# Patient Record
Sex: FEMALE | Race: WHITE | Employment: PART TIME | URBAN - METROPOLITAN AREA
[De-identification: names, ages, dates, MRNs, and addresses within clinical notes are randomized per-mention and may not be internally consistent; named-entity substitution may affect disease eponyms.]

---

## 2017-01-14 ENCOUNTER — HOSPITAL ENCOUNTER (OUTPATIENT)
Dept: MAMMOGRAPHY | Age: 62
Discharge: HOME | End: 2017-01-14
Attending: OBSTETRICS & GYNECOLOGY | Admitting: OBSTETRICS & GYNECOLOGY

## 2017-01-14 DIAGNOSIS — Z12.31 ENCOUNTER FOR SCREENING MAMMOGRAM FOR MALIGNANT NEOPLASM OF BREAST: ICD-10-CM

## 2018-05-26 ENCOUNTER — HOSPITAL ENCOUNTER (OUTPATIENT)
Dept: OTHER | Age: 63
Discharge: HOME | End: 2018-05-26
Attending: INTERNAL MEDICINE | Admitting: INTERNAL MEDICINE

## 2018-05-26 LAB
A/G RATIO: 2 (ref 1.1–2.2)
ALBUMIN SERPL-MCNC: 4.4 G/DL (ref 3.4–5)
ALP BLD-CCNC: 63 U/L (ref 40–129)
ALT SERPL-CCNC: 25 U/L (ref 10–40)
ANION GAP SERPL CALCULATED.3IONS-SCNC: 11 MMOL/L (ref 3–16)
AST SERPL-CCNC: 21 U/L (ref 15–37)
BASOPHILS ABSOLUTE: 0 K/UL (ref 0–0.2)
BASOPHILS RELATIVE PERCENT: 0.8 %
BILIRUB SERPL-MCNC: 0.4 MG/DL (ref 0–1)
BUN BLDV-MCNC: 22 MG/DL (ref 7–20)
CALCIUM SERPL-MCNC: 9.3 MG/DL (ref 8.3–10.6)
CHLORIDE BLD-SCNC: 105 MMOL/L (ref 99–110)
CHOLESTEROL, FASTING: 260 MG/DL (ref 0–199)
CO2: 25 MMOL/L (ref 21–32)
CREAT SERPL-MCNC: 0.8 MG/DL (ref 0.6–1.2)
EOSINOPHILS ABSOLUTE: 0.1 K/UL (ref 0–0.6)
EOSINOPHILS RELATIVE PERCENT: 2.3 %
GFR AFRICAN AMERICAN: >60
GFR NON-AFRICAN AMERICAN: >60
GLOBULIN: 2.2 G/DL
GLUCOSE BLD-MCNC: 106 MG/DL (ref 70–99)
HCT VFR BLD CALC: 42.4 % (ref 36–48)
HDLC SERPL-MCNC: 59 MG/DL (ref 40–60)
HEMOGLOBIN: 14.6 G/DL (ref 12–16)
LDL CHOLESTEROL CALCULATED: 158 MG/DL
LYMPHOCYTES ABSOLUTE: 1.8 K/UL (ref 1–5.1)
LYMPHOCYTES RELATIVE PERCENT: 33.6 %
MCH RBC QN AUTO: 29 PG (ref 26–34)
MCHC RBC AUTO-ENTMCNC: 34.4 G/DL (ref 31–36)
MCV RBC AUTO: 84.2 FL (ref 80–100)
MONOCYTES ABSOLUTE: 0.5 K/UL (ref 0–1.3)
MONOCYTES RELATIVE PERCENT: 8.7 %
NEUTROPHILS ABSOLUTE: 2.9 K/UL (ref 1.7–7.7)
NEUTROPHILS RELATIVE PERCENT: 54.6 %
PDW BLD-RTO: 13.5 % (ref 12.4–15.4)
PLATELET # BLD: 222 K/UL (ref 135–450)
PMV BLD AUTO: 7.6 FL (ref 5–10.5)
POTASSIUM SERPL-SCNC: 4.3 MMOL/L (ref 3.5–5.1)
RBC # BLD: 5.03 M/UL (ref 4–5.2)
SODIUM BLD-SCNC: 141 MMOL/L (ref 136–145)
TOTAL PROTEIN: 6.6 G/DL (ref 6.4–8.2)
TRIGLYCERIDE, FASTING: 216 MG/DL (ref 0–150)
TSH SERPL DL<=0.05 MIU/L-ACNC: 2.26 UIU/ML (ref 0.27–4.2)
VITAMIN B-12: 548 PG/ML (ref 211–911)
VLDLC SERPL CALC-MCNC: 43 MG/DL
WBC # BLD: 5.4 K/UL (ref 4–11)

## 2019-10-18 ENCOUNTER — OFFICE VISIT (OUTPATIENT)
Dept: ORTHOPEDIC SURGERY | Age: 64
End: 2019-10-18
Payer: MEDICARE

## 2019-10-18 VITALS — WEIGHT: 130.07 LBS | HEIGHT: 59 IN | BODY MASS INDEX: 26.22 KG/M2

## 2019-10-18 DIAGNOSIS — M76.61 RIGHT ACHILLES TENDINITIS: ICD-10-CM

## 2019-10-18 DIAGNOSIS — M25.572 ACUTE LEFT ANKLE PAIN: Primary | ICD-10-CM

## 2019-10-18 PROCEDURE — 1036F TOBACCO NON-USER: CPT | Performed by: ORTHOPAEDIC SURGERY

## 2019-10-18 PROCEDURE — 3017F COLORECTAL CA SCREEN DOC REV: CPT | Performed by: ORTHOPAEDIC SURGERY

## 2019-10-18 PROCEDURE — 1123F ACP DISCUSS/DSCN MKR DOCD: CPT | Performed by: ORTHOPAEDIC SURGERY

## 2019-10-18 PROCEDURE — 4040F PNEUMOC VAC/ADMIN/RCVD: CPT | Performed by: ORTHOPAEDIC SURGERY

## 2019-10-18 PROCEDURE — G8400 PT W/DXA NO RESULTS DOC: HCPCS | Performed by: ORTHOPAEDIC SURGERY

## 2019-10-18 PROCEDURE — G8428 CUR MEDS NOT DOCUMENT: HCPCS | Performed by: ORTHOPAEDIC SURGERY

## 2019-10-18 PROCEDURE — 99203 OFFICE O/P NEW LOW 30 MIN: CPT | Performed by: ORTHOPAEDIC SURGERY

## 2019-10-18 PROCEDURE — G8417 CALC BMI ABV UP PARAM F/U: HCPCS | Performed by: ORTHOPAEDIC SURGERY

## 2019-10-18 PROCEDURE — 1090F PRES/ABSN URINE INCON ASSESS: CPT | Performed by: ORTHOPAEDIC SURGERY

## 2019-10-18 PROCEDURE — G8484 FLU IMMUNIZE NO ADMIN: HCPCS | Performed by: ORTHOPAEDIC SURGERY

## 2020-07-29 ENCOUNTER — APPOINTMENT (OUTPATIENT)
Dept: GENERAL RADIOLOGY | Age: 65
End: 2020-07-29
Payer: MEDICARE

## 2020-07-29 ENCOUNTER — HOSPITAL ENCOUNTER (OUTPATIENT)
Age: 65
Setting detail: OBSERVATION
Discharge: HOME | End: 2020-07-30
Attending: EMERGENCY MEDICINE | Admitting: INTERNAL MEDICINE
Payer: MEDICARE

## 2020-07-29 PROBLEM — R07.9 CHEST PAIN: Status: ACTIVE | Noted: 2020-07-29

## 2020-07-29 PROBLEM — K21.9 GERD (GASTROESOPHAGEAL REFLUX DISEASE): Status: ACTIVE | Noted: 2020-07-29

## 2020-07-29 PROBLEM — R55 NEAR SYNCOPE: Status: ACTIVE | Noted: 2020-07-29

## 2020-07-29 LAB
A/G RATIO: 2 (ref 1.1–2.2)
ALBUMIN SERPL-MCNC: 4.5 G/DL (ref 3.4–5)
ALP BLD-CCNC: 66 U/L (ref 40–129)
ALT SERPL-CCNC: 28 U/L (ref 10–40)
ANION GAP SERPL CALCULATED.3IONS-SCNC: 12 MMOL/L (ref 3–16)
AST SERPL-CCNC: 21 U/L (ref 15–37)
BASOPHILS ABSOLUTE: 0 K/UL (ref 0–0.2)
BASOPHILS RELATIVE PERCENT: 0.9 %
BILIRUB SERPL-MCNC: 0.4 MG/DL (ref 0–1)
BUN BLDV-MCNC: 23 MG/DL (ref 7–20)
CALCIUM SERPL-MCNC: 9.7 MG/DL (ref 8.3–10.6)
CHLORIDE BLD-SCNC: 101 MMOL/L (ref 99–110)
CO2: 25 MMOL/L (ref 21–32)
CREAT SERPL-MCNC: 0.9 MG/DL (ref 0.6–1.2)
D DIMER: <200 NG/ML DDU (ref 0–229)
EKG ATRIAL RATE: 78 BPM
EKG DIAGNOSIS: NORMAL
EKG P AXIS: 58 DEGREES
EKG P-R INTERVAL: 156 MS
EKG Q-T INTERVAL: 384 MS
EKG QRS DURATION: 76 MS
EKG QTC CALCULATION (BAZETT): 437 MS
EKG R AXIS: 7 DEGREES
EKG T AXIS: 35 DEGREES
EKG VENTRICULAR RATE: 78 BPM
EOSINOPHILS ABSOLUTE: 0.1 K/UL (ref 0–0.6)
EOSINOPHILS RELATIVE PERCENT: 1.8 %
GFR AFRICAN AMERICAN: >60
GFR NON-AFRICAN AMERICAN: >60
GLOBULIN: 2.3 G/DL
GLUCOSE BLD-MCNC: 110 MG/DL (ref 70–99)
HCT VFR BLD CALC: 43.7 % (ref 36–48)
HEMOGLOBIN: 14.7 G/DL (ref 12–16)
LIPASE: 76 U/L (ref 13–60)
LYMPHOCYTES ABSOLUTE: 2.2 K/UL (ref 1–5.1)
LYMPHOCYTES RELATIVE PERCENT: 40.1 %
MCH RBC QN AUTO: 29 PG (ref 26–34)
MCHC RBC AUTO-ENTMCNC: 33.6 G/DL (ref 31–36)
MCV RBC AUTO: 86.3 FL (ref 80–100)
MONOCYTES ABSOLUTE: 0.4 K/UL (ref 0–1.3)
MONOCYTES RELATIVE PERCENT: 6.8 %
NEUTROPHILS ABSOLUTE: 2.8 K/UL (ref 1.7–7.7)
NEUTROPHILS RELATIVE PERCENT: 50.4 %
PDW BLD-RTO: 13.4 % (ref 12.4–15.4)
PLATELET # BLD: 191 K/UL (ref 135–450)
PMV BLD AUTO: 7.7 FL (ref 5–10.5)
POTASSIUM SERPL-SCNC: 3.8 MMOL/L (ref 3.5–5.1)
RBC # BLD: 5.06 M/UL (ref 4–5.2)
SODIUM BLD-SCNC: 138 MMOL/L (ref 136–145)
TOTAL PROTEIN: 6.8 G/DL (ref 6.4–8.2)
TROPONIN: <0.01 NG/ML
WBC # BLD: 5.5 K/UL (ref 4–11)

## 2020-07-29 PROCEDURE — 6370000000 HC RX 637 (ALT 250 FOR IP): Performed by: INTERNAL MEDICINE

## 2020-07-29 PROCEDURE — 2580000003 HC RX 258: Performed by: INTERNAL MEDICINE

## 2020-07-29 PROCEDURE — G0378 HOSPITAL OBSERVATION PER HR: HCPCS

## 2020-07-29 PROCEDURE — 85025 COMPLETE CBC W/AUTO DIFF WBC: CPT

## 2020-07-29 PROCEDURE — 2580000003 HC RX 258: Performed by: NURSE PRACTITIONER

## 2020-07-29 PROCEDURE — 93010 ELECTROCARDIOGRAM REPORT: CPT | Performed by: INTERNAL MEDICINE

## 2020-07-29 PROCEDURE — 80053 COMPREHEN METABOLIC PANEL: CPT

## 2020-07-29 PROCEDURE — 85379 FIBRIN DEGRADATION QUANT: CPT

## 2020-07-29 PROCEDURE — 93005 ELECTROCARDIOGRAM TRACING: CPT | Performed by: EMERGENCY MEDICINE

## 2020-07-29 PROCEDURE — 99285 EMERGENCY DEPT VISIT HI MDM: CPT

## 2020-07-29 PROCEDURE — 84484 ASSAY OF TROPONIN QUANT: CPT

## 2020-07-29 PROCEDURE — 83690 ASSAY OF LIPASE: CPT

## 2020-07-29 PROCEDURE — 71046 X-RAY EXAM CHEST 2 VIEWS: CPT

## 2020-07-29 PROCEDURE — 36415 COLL VENOUS BLD VENIPUNCTURE: CPT

## 2020-07-29 RX ORDER — PROMETHAZINE HYDROCHLORIDE 25 MG/1
12.5 TABLET ORAL EVERY 6 HOURS PRN
Status: DISCONTINUED | OUTPATIENT
Start: 2020-07-29 | End: 2020-07-30 | Stop reason: HOSPADM

## 2020-07-29 RX ORDER — SODIUM CHLORIDE 9 MG/ML
INJECTION, SOLUTION INTRAVENOUS CONTINUOUS
Status: DISCONTINUED | OUTPATIENT
Start: 2020-07-29 | End: 2020-07-29

## 2020-07-29 RX ORDER — NITROGLYCERIN 0.4 MG/1
0.4 TABLET SUBLINGUAL EVERY 5 MIN PRN
Status: DISCONTINUED | OUTPATIENT
Start: 2020-07-29 | End: 2020-07-30 | Stop reason: HOSPADM

## 2020-07-29 RX ORDER — ACETAMINOPHEN 650 MG/1
650 SUPPOSITORY RECTAL EVERY 6 HOURS PRN
Status: DISCONTINUED | OUTPATIENT
Start: 2020-07-29 | End: 2020-07-30 | Stop reason: HOSPADM

## 2020-07-29 RX ORDER — MORPHINE SULFATE 2 MG/ML
2 INJECTION, SOLUTION INTRAMUSCULAR; INTRAVENOUS EVERY 4 HOURS PRN
Status: DISCONTINUED | OUTPATIENT
Start: 2020-07-29 | End: 2020-07-30 | Stop reason: HOSPADM

## 2020-07-29 RX ORDER — SODIUM CHLORIDE 0.9 % (FLUSH) 0.9 %
10 SYRINGE (ML) INJECTION EVERY 12 HOURS SCHEDULED
Status: DISCONTINUED | OUTPATIENT
Start: 2020-07-29 | End: 2020-07-30 | Stop reason: HOSPADM

## 2020-07-29 RX ORDER — ACETAMINOPHEN 500 MG
500 TABLET ORAL EVERY 6 HOURS PRN
Status: DISCONTINUED | OUTPATIENT
Start: 2020-07-29 | End: 2020-07-29 | Stop reason: SDUPTHER

## 2020-07-29 RX ORDER — POLYETHYLENE GLYCOL 3350 17 G/17G
17 POWDER, FOR SOLUTION ORAL DAILY PRN
Status: DISCONTINUED | OUTPATIENT
Start: 2020-07-29 | End: 2020-07-30 | Stop reason: HOSPADM

## 2020-07-29 RX ORDER — ASPIRIN 81 MG/1
81 TABLET, CHEWABLE ORAL DAILY
Status: DISCONTINUED | OUTPATIENT
Start: 2020-07-29 | End: 2020-07-29

## 2020-07-29 RX ORDER — ONDANSETRON 2 MG/ML
4 INJECTION INTRAMUSCULAR; INTRAVENOUS EVERY 6 HOURS PRN
Status: DISCONTINUED | OUTPATIENT
Start: 2020-07-29 | End: 2020-07-30 | Stop reason: HOSPADM

## 2020-07-29 RX ORDER — 0.9 % SODIUM CHLORIDE 0.9 %
1000 INTRAVENOUS SOLUTION INTRAVENOUS ONCE
Status: COMPLETED | OUTPATIENT
Start: 2020-07-29 | End: 2020-07-29

## 2020-07-29 RX ORDER — ACETAMINOPHEN 325 MG/1
650 TABLET ORAL EVERY 6 HOURS PRN
Status: DISCONTINUED | OUTPATIENT
Start: 2020-07-29 | End: 2020-07-30 | Stop reason: HOSPADM

## 2020-07-29 RX ORDER — SODIUM CHLORIDE 0.9 % (FLUSH) 0.9 %
10 SYRINGE (ML) INJECTION PRN
Status: DISCONTINUED | OUTPATIENT
Start: 2020-07-29 | End: 2020-07-30 | Stop reason: HOSPADM

## 2020-07-29 RX ORDER — FAMOTIDINE 20 MG/1
20 TABLET, FILM COATED ORAL 2 TIMES DAILY PRN
Status: DISCONTINUED | OUTPATIENT
Start: 2020-07-29 | End: 2020-07-30 | Stop reason: HOSPADM

## 2020-07-29 RX ADMIN — SODIUM CHLORIDE 1000 ML: 9 INJECTION, SOLUTION INTRAVENOUS at 12:48

## 2020-07-29 RX ADMIN — SODIUM CHLORIDE: 9 INJECTION, SOLUTION INTRAVENOUS at 17:25

## 2020-07-29 RX ADMIN — Medication 10 ML: at 17:25

## 2020-07-29 RX ADMIN — ASPIRIN 81 MG: 81 TABLET, CHEWABLE ORAL at 17:24

## 2020-07-29 ASSESSMENT — PAIN SCALES - GENERAL
PAINLEVEL_OUTOF10: 0
PAINLEVEL_OUTOF10: 0
PAINLEVEL_OUTOF10: 2
PAINLEVEL_OUTOF10: 1

## 2020-07-29 ASSESSMENT — PAIN DESCRIPTION - LOCATION
LOCATION: HEAD
LOCATION: HEAD

## 2020-07-29 ASSESSMENT — PAIN DESCRIPTION - PAIN TYPE
TYPE: ACUTE PAIN
TYPE: ACUTE PAIN

## 2020-07-29 ASSESSMENT — PAIN DESCRIPTION - DESCRIPTORS: DESCRIPTORS: HEADACHE

## 2020-07-29 ASSESSMENT — HEART SCORE: ECG: 1

## 2020-07-29 NOTE — ED NOTES
Lab is called regarding add-on tests that are not in process at this time. Tech reports that these tests will be processed now. Will monitor for results.       Elizabeth Trotter RN  07/29/20 8453

## 2020-07-29 NOTE — ED NOTES
Bed: 14  Expected date:   Expected time:   Means of arrival:   Comments:  nahid     Author GUADALUPE Torres  07/29/20 2254

## 2020-07-29 NOTE — ED PROVIDER NOTES
changes: present  Prior EKG comparison: No prior is currently available for comparison    MDM:  Diagnostic considerations included benign positional vertigo, labyrinthitis/otitis, sepsis, dehydration/orthostasis, vasovagal reaction, stroke, TIA, intracranial bleed, migraine, anxiety, ACS/dysrhythmia, medication side effects, head trauma    ED course was notable for near syncope with some vague chest discomfort, she thinks it might be reflux however her EKG is abnormal with no previous for comparison with subtle ST depressions in the lateral precordial leads. Heart score is 5. Shared medical decision making discussion had with the patient who is agreeable to overnight admission for cardiac rule out event. D-dimer negative to rule out PE. Initial troponin is negative. Nonfocal neuro exam, no focal neuro sx otherwise to suggest need for HCT. CLINICAL IMPRESSION  1. Near syncope    2. Chest discomfort    3. Abnormal EKG        DISPOSITION  Celsa Reyes was admitted in fair condition. The plan is to admit to the hospital at this time under the hospitalist service. Hospitalist accepted the patient and will take over the patient's care. This chart was created using Dragon dictation software. Efforts were made by me to ensure accuracy, however some errors may be present due to limitations of this technology.          Vadim An MD  07/29/20 6100

## 2020-07-29 NOTE — PROGRESS NOTES
07/29/20 @ [address] -Neurology consult completed at this time and Dr. Zakiya Hall added to treatment.     -Fayrene Comes, PCA

## 2020-07-29 NOTE — ED PROVIDER NOTES
EMERGENCY DEPARTMENT ENCOUNTER      This patient was seen and evaluated by the attending physician. Pt Name: Mahnaz Villalba  MRN: [id number]  [place] 1955  Date of evaluation: 2020  Provider: FRANKLIN Park CNP-C  PCP: Donald Gonzales MD  ED Attending: Bryant Boggs MD    History provided by the patient. CHIEF COMPLAINT:     Chief Complaint   Patient presents with    Chest Pain     near syncopal episode at work / denies LOC / +chest pressure at time of squad arrival, resolved, now has 'indigestion' / denies SOA       HISTORY OF PRESENT ILLNESS:      Mahanz Villalba is a 77 y.o. female who presents [facility]  ED with complaints of chest pain. 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 She denied any chest pain or difficulty breathing, she states that after the episode she did develop some chest pressure which she describes as indigestion. She denies that she had any unilateral weakness. Denies trauma. She is here for further evaluation. Location:-  Quality:-  Severity:-  Duration:-  Modifying factors:-    Nursing Notes were reviewed     REVIEW OF SYSTEMS:     Review of Systems  All systems, atotal of 10, are reviewed and negative except for those that were just noted in history present illness.         PAST MEDICAL HISTORY:     Past Medical History:   Diagnosis Date    Asthma     very mild    Gastritis     ulcer    Reflux          SURGICAL HISTORY:      Past Surgical History:   Procedure Laterality Date     SECTION      2 surgeries    COLON SURGERY       for mid-transverse volvulus;& then release of small bowel obstruction    COLONOSCOPY  2015    normal    KIDNEY REMOVAL Left          CURRENT MEDICATIONS:       Previous Medications    ACETAMINOPHEN (TYLENOL) 500 MG TABLET    Take 500 mg by mouth every 6 hours as needed for Pain    ALBUTEROL SULFATE (PROVENTIL HFA IN)    Inhale into the lungs as needed    CALCIUM CARBONATE (TUMS) 500 MG CHEWABLE TABLET    Take 1 tablet by mouth as needed for Heartburn    IBUPROFEN (ADVIL;MOTRIN) 200 MG CAPS    Take 1 capsule by mouth as needed. RANITIDINE (ZANTAC) 150 MG TABLET    Take 150 mg by mouth as needed. ALLERGIES:    Shellfish-derived products; Definity [perflutren lipid microsphere]; and Iv dye [iodides]    FAMILY HISTORY:     History reviewed. No pertinent family history.        SOCIAL HISTORY:       Social History     Socioeconomic History    Marital status:      Spouse name: None    Number of children: None    Years of education: None    Highest education level: None   Occupational History    None   Social Needs    Financial resource strain: None    Food insecurity     Worry: None     Inability: None    Transportation needs     Medical: None     Non-medical: None   Tobacco Use    Smoking status: Former Smoker     Packs/day: 0.50     Years: 10.00     Pack years: 5.00     Types: Cigarettes     Last attempt to quit: 3/25/1987     Years since quittin.3    Smokeless tobacco: Never Used   Substance and Sexual Activity    Alcohol use: Yes     Comment: rarely    Drug use: No    Sexual activity: Yes     Partners: Male   Lifestyle    Physical activity     Days per week: None     Minutes per session: None    Stress: None   Relationships    Social connections     Talks on phone: None     Gets together: None     Attends L[employer] service: None     Active member of club or organization: None     Attends meetings of clubs or organizations: None     Relationship status: None    Intimate partner violence     Fear of current or ex partner: None     Emotionally abused: None     Physically abused: None     Forced sexual activity: None   Other Topics Concern    None   Social History Narrative  None       SCREENINGS:   Roxana Coma Scale  Eye Opening: Spontaneous  Best Verbal Response: Oriented  Best Motor Response: Obeys commands  Roxana Coma Scale Score: 15 Heart Score for chest pain patients  History: Moderately Suspicious  ECG: Non-Specifc repolarization disturbance/LBTB/PM  Patient Age: > 65 years  *Risk factors for Atherosclerotic disease: Hypercholesterolemia, Positive family History  Risk Factors: 1 or 2 risk factors  Troponin: < 1X normal limit  Heart Score Total: 5      PHYSICAL EXAM:       ED Triage Vitals [07/29/20 1132]   BP Temp Temp Source Pulse Resp SpO2 Height Weight   (!) 150/97 98.3 °F (36.8 °C) Oral 77 20 98 % 4' 11\" (1.499 m) 132 lb (59.9 kg)       Physical Exam    CONSTITUTIONAL: Awake and alert. Cooperative. Well-developed. Well-nourished. Vitals:    07/29/20 1240 07/29/20 1241 07/29/20 1242 07/29/20 1442   BP: (!) 156/72 (!) 158/79 (!) 159/82 133/67   Pulse: 81 71 69 65   Resp: 26 15 23 15   Temp:       TempSrc:       SpO2: 96% 97% 97% 97%   Weight:       Height:         HENT: Normocephalic. Atraumatic. External ears normal, without discharge. TMs clear bilaterally. No nasal discharge. Oropharynx clear, no erythema. Mucous membranes moist.  EYES: Conjunctiva non-injected, no lid abnormalities noted. No scleral icterus. PERRL. EOM's grossly intact. Anterior chambers clear. NECK: Supple. Normal ROM. No meningismus. No thyroid tenderness or swelling noted. CARDIOVASCULAR: RRR. No Murmer. PULMONARY/CHEST WALL: Effort normal. No tachypnea. Lungs clear to ausculation. ABDOMEN: Normal BS. Soft. Nondistended. No tenderness to palpate. No guarding. No hernias noted. No splenomegaly. Back: Spine is midline. No ecchymosis. No crepitus on palpation. No obvious subluxation of vertebral column. No saddle anesthesia or evidence of cauda equina. /ANORECTAL: Not assessed  MUSKULOSKELETAL: Normal ROM. No acute deformities. No edema. No tenderness to palpate. SKIN: Warm and dry. NEUROLOGICAL:  GCS 15. CN II-XII grossly intact. Strength is 5/5 in allextremities and sensation is intact. PSYCHIATRIC: Normal affect, normal insight and judgement. Alert andoriented x 3.         DIAGNOSTIC RESULTS:     LABS:    Results for orders placed or performed during the hospital encounter of 07/29/20   CBC auto differential   Result Value Ref Range    WBC 5.5 4.0 - 11.0 K/uL    RBC 5.06 4.00 - 5.20 M/uL    Hemoglobin 14.7 12.0 - 16.0 g/dL    Hematocrit 43.7 36.0 - 48.0 %    MCV 86.3 80.0 - 100.0 fL    MCH 29.0 26.0 - 34.0 pg    MCHC 33.6 31.0 - 36.0 g/dL    RDW 13.4 12.4 - 15.4 %    Platelets 227 181 - 616 K/uL    MPV 7.7 5.0 - 10.5 fL    Neutrophils % 50.4 %    Lymphocytes % 40.1 %    Monocytes % 6.8 %    Eosinophils % 1.8 %    Basophils % 0.9 %    Neutrophils Absolute 2.8 1.7 - 7.7 K/uL    Lymphocytes Absolute 2.2 1.0 - 5.1 K/uL    Monocytes Absolute 0.4 0.0 - 1.3 K/uL    Eosinophils Absolute 0.1 0.0 - 0.6 K/uL    Basophils Absolute 0.0 0.0 - 0.2 K/uL   Comprehensive metabolic panel   Result Value Ref Range    Sodium 138 136 - 145 mmol/L    Potassium 3.8 3.5 - 5.1 mmol/L    Chloride 101 99 - 110 mmol/L    CO2 25 21 - 32 mmol/L    Anion Gap 12 3 - 16    Glucose 110 (H) 70 - 99 mg/dL    BUN 23 (H) 7 - 20 mg/dL    CREATININE 0.9 0.6 - 1.2 mg/dL    GFR Non-African American >60 >60    GFR African American >60 >60    Calcium 9.7 8.3 - 10.6 mg/dL    Total Protein 6.8 6.4 - 8.2 g/dL    Alb 4.5 3.4 - 5.0 g/dL    Albumin/Globulin Ratio 2.0 1.1 - 2.2    Total Bilirubin 0.4 0.0 - 1.0 mg/dL    Alkaline Phosphatase 66 40 - 129 U/L    ALT 28 10 - 40 U/L    AST 21 15 - 37 U/L    Globulin 2.3 g/dL   Troponin   Result Value Ref Range    Troponin <0.01 <0.01 ng/mL   D-dimer, quantitative   Result Value Ref Range    D-Dimer, Quant <200 0 - 229 ng/mL DDU   Lipase   Result Value Ref Range    Lipase 76.0 (H) 13.0 - 60.0 U/L   EKG 12 Lead   Result Value Ref Range    Ventricular Rate 78 BPM    Atrial Rate 78 BPM    P-R Interval 156 ms    QRS Duration 76 ms    Q-T Interval 384 ms    QTc Calculation (Bazett) 437 ms    P Axis 58 degrees    R Axis 7 degrees    T Axis 35 degrees    Diagnosis       Normal sinus rhythmNonspecific ST and T wave abnormalityAbnormal ECGNo previous ECGs available         RADIOLOGY:  All x-ray studies are viewed/reviewedby me. Formal interpretations per the radiologist are as follows:      XR CHEST (2 VW)   Final Result   No acute process. EKG:  See EKG interpretation by an attending phsyician      PROCEDURES:   N/A    CRITICAL CARE TIME:   N/A    CONSULTS:  IP CONSULT TO HOSPITALIST      EMERGENCYDEPARTMENT COURSE and DIFFERENTIAL DIAGNOSIS/MDM:   Vitals:    Vitals:    07/29/20 1240 07/29/20 1241 07/29/20 1242 07/29/20 1442   BP: (!) 156/72 (!) 158/79 (!) 159/82 133/67   Pulse: 81 71 69 65   Resp: 26 15 23 15   Temp:       TempSrc:       SpO2: 96% 97% 97% 97%   Weight:       Height:           Patient was given the following medications:  Medications   0.9 % sodium chloride bolus (0 mLs Intravenous Stopped 7/29/20 1352)         Patient was evaluated by both myself and Catalina Abernathy MD.    Patient presented to the emergency room today with complaints of an episode of near syncope. Patient then developed some chest discomfort after the episode. Patient assessment today was unremarkable, no neurologic deficits noted. NIHSS was negative. Patient laboratory studies were also unremarkable. Patient EKG did show some slight abnormalities, and patient had an elevated heart score so we did feel that admission to the hospital was reasonable for further evaluation and treatment. Patient was evaluated by the attending physician who also agrees this plan of care. Patient in agreement with admission. Case was discussed with the hospitalist who did agree to care for this patient as an inpatient.     Patient laboratory studies, radiographic imaging, andassessment were all discussed with the patient and/or patient family. There was shared decision-making between myself, the attending physician, as well as the patient and/or their surrogate and we are all in agreement with admission. There was an opportunity for questions and all questions were answered to the best of my ability and to the satisfaction of the patient and/or patient family. FINAL IMPRESSION:      1. Near syncope    2. Chest discomfort    3. Abnormal EKG          DISPOSITION/PLAN:   DISPOSITIONDecision To Admit      PATIENT REFERRED TO:  No follow-up provider specified.     DISCHARGE MEDICATIONS:  New Prescriptions    No medications on file                  (Please note that portions of this note were completed with a voice recognition program.  Efforts were made to edit the dictations, but occasionally words are mis-transcribed.)    FRANKLIN Lopez CNP-C (electronically signed)        FRANKLIN Lopez CNP  07/29/20 0880

## 2020-07-29 NOTE — ED NOTES
Pt ambulatory to restroom with standby assist. Her gait is steady and she denies any dizziness, shortness of breath, chest pain.       Sagar Diehl, GUADALUPE  07/29/20 8774

## 2020-07-29 NOTE — ED NOTES
[email or web address]  Re: admit.  ACS r/o per Kaela Villegas returned [email or web address]     Leafy Spark Naegele  07/29/20 8087

## 2020-07-29 NOTE — ED NOTES
Pt is identified as a positive fall risk on the Theoplis Eda Scale. Pt placed in fall precautions which include: yellow fall armband on wrist, yellow socks on feet, \"Be Safe\" sign placed on patient's door, and bed alarm placed under patient/alarm turned on. Pt instructed on the importance of not getting out of bed and calling for assistance for safety.         Dina Agee RN  07/29/20 2407

## 2020-07-29 NOTE — PLAN OF CARE
Problem: Pain:  Goal: Pain level will decrease  Description: Pain level will decrease  Outcome: Ongoing   Patient denies chest pain at this time. Vital signs obtained per MD order. Telemetry monitor remains in place.

## 2020-07-30 ENCOUNTER — APPOINTMENT (OUTPATIENT)
Dept: CT IMAGING | Age: 65
End: 2020-07-30
Payer: MEDICARE

## 2020-07-30 VITALS
BODY MASS INDEX: 26.61 KG/M2 | SYSTOLIC BLOOD PRESSURE: 128 MMHG | TEMPERATURE: 98 F | HEART RATE: 62 BPM | HEIGHT: 59 IN | RESPIRATION RATE: 16 BRPM | DIASTOLIC BLOOD PRESSURE: 76 MMHG | WEIGHT: 132 LBS | OXYGEN SATURATION: 98 %

## 2020-07-30 LAB
EKG ATRIAL RATE: 59 BPM
EKG DIAGNOSIS: NORMAL
EKG P AXIS: 55 DEGREES
EKG P-R INTERVAL: 192 MS
EKG Q-T INTERVAL: 424 MS
EKG QRS DURATION: 82 MS
EKG QTC CALCULATION (BAZETT): 419 MS
EKG R AXIS: 18 DEGREES
EKG T AXIS: 43 DEGREES
EKG VENTRICULAR RATE: 59 BPM
LV EF: 55 %
LVEF MODALITY: NORMAL
TROPONIN: <0.01 NG/ML

## 2020-07-30 PROCEDURE — 93010 ELECTROCARDIOGRAM REPORT: CPT | Performed by: INTERNAL MEDICINE

## 2020-07-30 PROCEDURE — 93005 ELECTROCARDIOGRAM TRACING: CPT | Performed by: INTERNAL MEDICINE

## 2020-07-30 PROCEDURE — 84484 ASSAY OF TROPONIN QUANT: CPT

## 2020-07-30 PROCEDURE — 2580000003 HC RX 258: Performed by: INTERNAL MEDICINE

## 2020-07-30 PROCEDURE — 93306 TTE W/DOPPLER COMPLETE: CPT

## 2020-07-30 PROCEDURE — 70450 CT HEAD/BRAIN W/O DYE: CPT

## 2020-07-30 PROCEDURE — G0378 HOSPITAL OBSERVATION PER HR: HCPCS

## 2020-07-30 PROCEDURE — 99219 PR INITIAL OBSERVATION CARE/DAY 50 MINUTES: CPT | Performed by: PSYCHIATRY & NEUROLOGY

## 2020-07-30 PROCEDURE — 96372 THER/PROPH/DIAG INJ SC/IM: CPT

## 2020-07-30 PROCEDURE — 6360000002 HC RX W HCPCS: Performed by: INTERNAL MEDICINE

## 2020-07-30 RX ADMIN — Medication 10 ML: at 07:59

## 2020-07-30 RX ADMIN — ENOXAPARIN SODIUM 40 MG: 40 INJECTION SUBCUTANEOUS at 07:59

## 2020-07-30 ASSESSMENT — PAIN SCALES - GENERAL: PAINLEVEL_OUTOF10: 0

## 2020-07-30 NOTE — DISCHARGE SUMMARY
Hospital Medicine Discharge Summary    Patient ID: Marge Blizzard      Patient's PCP: Erica Dumont MD    Admit Date: 7/29/2020     Discharge Date:   7/30/2020    Admitting Physician: Vipul Chaudhry MD     Discharge Physician: FRANKLIN Tello - CNP     Discharge Diagnoses: Active Hospital Problems    Diagnosis    Dizziness [R42]    Chest pain [R07.9]    Near syncope [R55]    GERD (gastroesophageal reflux disease) [K21.9]    Headache [R51]       The patient was seen and examined on day of discharge and this discharge summary is in conjunction with any daily progress note from day of discharge. Hospital Course:   77 y.o. female who presented to [facility] with complaints of lightheadedness. 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 She was at home doing telemedicine, sitting at a desk when she suddenly felt lightheaded. She did not lose consciousness. She had already eaten breakfast but reports sometimes she gets low blood sugar so she was trying to stand up to go get something to eat. But she could not successfully ambulate safely, her  came up to help her. In the meantime she had used some smelling salts which were in her bag to wake her up. She reports briefly she had some chest pressure which went away spontaneously. This episode lasted for about 4 to 5 minutes. After the lightheadedness resolved, she developed some nausea without vomiting. Patient denied any numbness weakness or tingling in the extremities. Denied any double vision. Denied vertigo. Denied speech problems or facial droop.  reports he did not notice any facial droop for seizure-like episode. Patient also reports was having a lot of indigestion today with some chest discomfort.  She has been belching.      Near syncope:  - So far no clear etiology. Could be cryptogenic, arrhythmia, vasovagal or hypotensive episode. Less likely seizure.  Orthostatic vital signs in the ED were negative. - CT head showed no acute intracranial abnormality.   - Pt monitored on telemetry while inpt, no arrhythmias or ectopy noted. - Patient received 1 L normal saline bolus in ED. - Blood sugar on arrival 110, unlikely hypoglycemic episode. - Neurology consulted. Recommended prolonged cardiac monitoring to rule out cardiac arrhythmia if symptoms recur. Chronic daily headaches:  - Has some features of migraines. - Neurology recommended rechecking ESR and CRP if symptoms recur and consider outpt MRI brain and starting preventative medications for chronic headaches. - Pt has an appointment with a neurologist next month. - Continue tylenol prn.      Mild chest discomfort, transient:   - EKG non-acute. Serial troponin trend negative x3. D-dimer is negative, low suspicion for PE. Obtained ECHO while inpt however read is still pending -- pt will follow-up with PCP for results. Physical Exam Performed:     /76   Pulse 62   Temp 98 °F (36.7 °C) (Oral)   Resp 16   Ht 4' 11\" (1.499 m)   Wt 132 lb (59.9 kg)   SpO2 98%   BMI 26.66 kg/m²       General appearance:  No apparent distress, appears stated age and cooperative. HEENT:  Normal cephalic, atraumatic without obvious deformity. Pupils equal, round, and reactive to light. Extra ocular muscles intact. Conjunctivae/corneas clear. Neck: Supple, with full range of motion. No jugular venous distention. Trachea midline. Respiratory:  Normal respiratory effort. Clear to auscultation, bilaterally without Rales/Wheezes/Rhonchi. Cardiovascular:  Regular rate and rhythm with normal S1/S2 without murmurs, rubs or gallops. Abdomen: Soft, non-tender, non-distended with normal bowel sounds. Musculoskeletal:  No clubbing, cyanosis or edema bilaterally. Full range of motion without deformity. Skin: Skin color, texture, turgor normal.  No rashes or lesions.   Neurologic:  Neurovascularly intact without any focal sensory/motor deficits. Cranial nerves: II-XII intact, grossly non-focal.  Psychiatric:  Alert and oriented, thought content appropriate, normal insight  Capillary Refill: Brisk,< 3 seconds   Peripheral Pulses: +2 palpable, equal bilaterally       Labs: For convenience and continuity at follow-up the following most recent labs are provided:      CBC:    Lab Results   Component Value Date    WBC 5.5 07/29/2020    HGB 14.7 07/29/2020    HCT 43.7 07/29/2020     07/29/2020       Renal:    Lab Results   Component Value Date     07/29/2020    K 3.8 07/29/2020     07/29/2020    CO2 25 07/29/2020    BUN 23 07/29/2020    CREATININE 0.9 07/29/2020    CALCIUM 9.7 07/29/2020         Significant Diagnostic Studies    Radiology:   CT HEAD WO CONTRAST   Final Result   No acute intracranial abnormality. XR CHEST (2 VW)   Final Result   No acute process. Consults:     IP CONSULT TO HOSPITALIST  IP CONSULT TO NEUROLOGY    Disposition:  Home     Condition at Discharge: Stable    Discharge Instructions/Follow-up:  Follow-up with PCP     Code Status:  Full Code     Activity: activity as tolerated    Diet: regular diet      Discharge Medications:     Current Discharge Medication List           Details   calcium carbonate (TUMS) 500 MG chewable tablet Take 1 tablet by mouth as needed for Heartburn      acetaminophen (TYLENOL) 500 MG tablet Take 500 mg by mouth every 6 hours as needed for Pain      Albuterol Sulfate (PROVENTIL HFA IN) Inhale into the lungs as needed      ibuprofen (ADVIL;MOTRIN) 200 MG CAPS Take 1 capsule by mouth as needed. ranitidine (ZANTAC) 150 MG tablet Take 150 mg by mouth as needed. Time Spent on discharge is more than 45 minutes in the examination, evaluation, counseling and review of medications and discharge plan. Signed:    FRANKLIN Baldwin - CNP   7/30/2020      Thank you Sree Warner MD for the opportunity to be involved in this patient's care.

## 2020-07-30 NOTE — H&P
as needed for Heartburn   Yes Historical Provider, MD   acetaminophen (TYLENOL) 500 MG tablet Take 500 mg by mouth every 6 hours as needed for Pain   Yes Historical Provider, MD   Albuterol Sulfate (PROVENTIL HFA IN) Inhale into the lungs as needed    Historical Provider, MD   ibuprofen (ADVIL;MOTRIN) 200 MG CAPS Take 1 capsule by mouth as needed. Historical Provider, MD   ranitidine (ZANTAC) 150 MG tablet Take 150 mg by mouth as needed. Historical Provider, MD       Allergies:  Shellfish-derived products; Definity [perflutren lipid microsphere]; and Iv dye [iodides]    Social History:      The patient currently lives at home    TOBACCO:   reports that she quit smoking about 33 years ago. Her smoking use included cigarettes. She has a 5.00 pack-year smoking history. She has never used smokeless tobacco.  ETOH:   reports current alcohol use. E-Cigarettes Vaping or Juuling     Questions Responses    Vaping Use     Start Date     Does device contain nicotine? Quit Date     Vaping Type             Family History:   Reviewed in detail and negative for DM, CAD, Cancer, CVA. REVIEW OF SYSTEMS:   Pertinent positives as noted in the HPI. All other systems reviewed and negative. PHYSICAL EXAM PERFORMED:    /75   Pulse 70   Temp 98.2 °F (36.8 °C) (Oral)   Resp 16   Ht 4' 11\" (1.499 m)   Wt 132 lb (59.9 kg)   SpO2 99%   BMI 26.66 kg/m²     General appearance:  No apparent distress, appears stated age and cooperative. HEENT:  Normal cephalic, atraumatic without obvious deformity. Pupils equal, round, and reactive to light. Extra ocular muscles intact. Conjunctivae/corneas clear. Neck: Supple, with full range of motion. No jugular venous distention. Trachea midline. Respiratory:  Normal respiratory effort. Clear to auscultation, bilaterally without Rales/Wheezes/Rhonchi. Cardiovascular:  Regular rate and rhythm with normal S1/S2 without murmurs, rubs or gallops.   Abdomen: Soft, non-tender, non-distended with normal bowel sounds. Musculoskeletal:  No clubbing, cyanosis or edema bilaterally. Full range of motion without deformity. Skin: Skin color, texture, turgor normal.  No rashes or lesions. Neurologic:  Neurovascularly intact without any focal sensory/motor deficits. Cranial nerves: II-XII intact, grossly non-focal.  Psychiatric:  Alert and oriented, thought content appropriate, normal insight  Capillary Refill: Brisk,< 3 seconds   Peripheral Pulses: +2 palpable, equal bilaterally       Labs:     Recent Labs     07/29/20  1143   WBC 5.5   HGB 14.7   HCT 43.7        Recent Labs     07/29/20  1143      K 3.8      CO2 25   BUN 23*   CREATININE 0.9   CALCIUM 9.7     Recent Labs     07/29/20  1143   AST 21   ALT 28   BILITOT 0.4   ALKPHOS 66     No results for input(s): INR in the last 72 hours. Recent Labs     07/29/20  1143 07/29/20  1724 07/29/20  2040   TROPONINI <0.01 <0.01 <0.01       Urinalysis:    No results found for: Deannie Aleah, BACTERIA, RBCUA, BLOODU, SPECGRAV, GLUCOSEU    Radiology:     CXR: I have reviewed the CXR with the following interpretation: No acute process  EKG:  I have reviewed the EKG with the following interpretation: NSR, normal intervals, trivial ST depression in V2 to V4, no other acute ischemic changes, compared to prior EKG from care everywhere in 2017 this appears new    On care everywhere  Test Date:    2017-08-30 08:44:13  EKG  Measurements  Intervals                              Axis            Rate:         65                       P:            55  ID:           152                      QRS:          45  QRSD:         80                       T:            61  QT:           394                                      QTc:          410                                      Interpretive Statements  Sinus rhythm  Abnormal R-wave progression, early transition  No change from previous EKG  Electronically Signed On 8- 20:27:09 EDT by Hanane SAN MD Patti        XR CHEST (2 VW)   Final Result   No acute process. ASSESSMENT:PLAN:    Near syncope episode  Likely etiology vasovagal attack vs TIA vs orthostasis  -Orthostatic vital signs in the ED negative  -No other focal neurological signs or symptoms by history, cannot rule out TIA, consult neurology  -Monitor on telemetry for any cardiac arrhythmias  -Patient received 1 L normal saline bolus in ED, no need for MIVF  -Blood sugar on arrival 110, unlikely hypoglycemic episode    Mild chest discomfort, transient with suspected abnormal EKG  EKG showing precordial ST depression trivial, looking at the EKG report from 2017 on care everywhere [no EKG image available] this ST depression appears new. Troponin negative. Will follow serial troponins. Repeat EKG in a.m. to look for any dynamic changes. If she rules out for MI, I do not think she needs any further ischemic work-up. Can consider stress test as an outpatient for further risk stratification if symptoms recur. Stress echo back in 2010 was normal  D-dimer is negative, low suspicion for PE    GERD -H2 blocker as needed    Headache -Tylenol as needed    DVT Prophylaxis: Lovenox  Diet: DIET CARDIAC;  Code Status: Full Code    PT/OT Eval Status: Ambulatory    Dispo -observation       Zara Valenzuela MD    Thank you Yevgeniy Orr MD for the opportunity to be involved in this patient's care. If you have any questions or concerns please feel free to contact me at [phone].

## 2020-07-30 NOTE — CONSULTS
associated symptom except pain behind her right eye. No visual loss or temporal tenderness. No photophobia or phonophobia. She takes over-the-counter as needed. She had work-up in the past with a CT several years ago as well as ESR and CRP which were unremarkable. She is scheduled see neurology in the next month or 2. Today she denies any severe headache, visual changes or jaw claudication. She has not had any recent neuroimaging. Other review of system was unremarkable. History reviewed. No pertinent family history.   Family history: Noncontributory  Past Surgical History:   Procedure Laterality Date     SECTION      2 surgeries    COLON SURGERY       for mid-transverse volvulus;& then release of small bowel obstruction    COLONOSCOPY  2015    normal    KIDNEY REMOVAL Left         Past Medical History:   Diagnosis Date    Asthma     very mild    Gastritis     ulcer    Reflux      Social History     Tobacco Use    Smoking status: Former Smoker     Packs/day: 0.50     Years: 10.00     Pack years: 5.00     Types: Cigarettes     Last attempt to quit: 3/25/1987     Years since quittin.3    Smokeless tobacco: Never Used   Substance Use Topics    Alcohol use: Yes     Comment: rarely    Drug use: No     Allergies   Allergen Reactions    Shellfish-Derived Products      Throat swells    Definity [Perflutren Lipid Microsphere]     Iv Dye [Iodides]      Current Facility-Administered Medications   Medication Dose Route Frequency Provider Last Rate Last Dose    famotidine (PEPCID) tablet 20 mg  20 mg Oral BID PRN Rich Handley MD        sodium chloride flush 0.9 % injection 10 mL  10 mL Intravenous 2 times per day Rich Handley MD   10 mL at 20 0750    sodium chloride flush 0.9 % injection 10 mL  10 mL Intravenous PRN Rich Handley MD   10 mL at 20 1725    acetaminophen (TYLENOL) tablet 650 mg  650 mg Oral Q6H PRN Rich Handley MD        Or round, reactive to light  III,IV,VI: Extra Ocular Movements are intact. No nystagmus  V: Facial sensation is intact   VII: Facial strength and movements: intact and symmetric  VIII: Hearing: Intact to finger rub bilaterally  IX: Palate elevation is symmetric  XI: Shoulder shrug is intact  XII: Tongue movements are normal  Musculoskeletal: 5/5 in all 4 extremities. Tone: Normal tone. Reflexes: Bilateral biceps 2/4, triceps 2/4, brachial radialis 2/4, knee 2/4 and ankle 2/4. Planters: flexor bilaterally. Coordination: no pronator drift, no dysmetria with FNF in upper extremities. Normal REM. Sensation: normal to all modalities in both arms and legs. Gait/Posture: steady gait and normal posturing and station. Data:  LABS:   Lab Results   Component Value Date     07/29/2020    K 3.8 07/29/2020     07/29/2020    CO2 25 07/29/2020    BUN 23 07/29/2020    CREATININE 0.9 07/29/2020    GFRAA >60 07/29/2020    LABGLOM >60 07/29/2020    GLUCOSE 110 07/29/2020    CALCIUM 9.7 07/29/2020     Lab Results   Component Value Date    WBC 5.5 07/29/2020    RBC 5.06 07/29/2020    HGB 14.7 07/29/2020    HCT 43.7 07/29/2020    MCV 86.3 07/29/2020    RDW 13.4 07/29/2020     07/29/2020   No results found for: INR, PROTIME    Neuroimaging were independently reviewed by myself and discussed results with the patient  Reviewed notes from different physicians  Reviewed lab and blood testing    Impression:  New onset lightheadedness and presyncope. So far no specific etiology. Could be cryptogenic, arrhythmia, vasovagal or hypotensive episode. Less likely seizure or central.  Will get CT head noncontrast before she leaves the hospital to rule out any possibility of central etiology although exam today was nonfocal.  Chronic daily headaches. Less likely GCA or temporal arteritis. Possible chronic migraine or tension headache. Has some features of migraines.     Recommendation:  CT head noncontrast  Echocardiogram.  Can be done in the outpatient setting if she cannot have her echo today  If symptoms recur, would recommend prolonged cardiac monitoring to rule out cardiac arrhythmia  Discussed positional and physical precautions with the patient  The patient has an appointment with neurology in the next month or so for management of her chronic headaches. I would recommend rechecking ESR and CRP if symptoms recur and consider outpatient MRI brain and starting preventative medications for chronic headaches  DVT and GI prophylaxis  Can be discharged from neurology today if medically stable  No further recommendation. Thank you for referring such patient. If you have any questions regarding my consult note, please don't hesitate to call me. Stephanie Braun MD  [phone]    This dictation was generated by voice recognition computer software.  Although all attempts are made to edit the dictation for accuracy, there may be errors in the  transcription that are not intended

## 2025-05-06 ENCOUNTER — APPOINTMENT (OUTPATIENT)
Dept: CT IMAGING | Age: 70
End: 2025-05-06
Payer: COMMERCIAL

## 2025-05-06 ENCOUNTER — HOSPITAL ENCOUNTER (EMERGENCY)
Age: 70
Discharge: HOME | End: 2025-05-06
Payer: COMMERCIAL

## 2025-05-06 ENCOUNTER — APPOINTMENT (OUTPATIENT)
Dept: GENERAL RADIOLOGY | Age: 70
End: 2025-05-06
Payer: COMMERCIAL

## 2025-05-06 VITALS
HEART RATE: 65 BPM | BODY MASS INDEX: 26.86 KG/M2 | SYSTOLIC BLOOD PRESSURE: 158 MMHG | DIASTOLIC BLOOD PRESSURE: 72 MMHG | TEMPERATURE: 98.2 F | OXYGEN SATURATION: 98 % | RESPIRATION RATE: 14 BRPM | WEIGHT: 133 LBS

## 2025-05-06 DIAGNOSIS — R10.13 DYSPEPSIA: ICD-10-CM

## 2025-05-06 DIAGNOSIS — R74.8 ELEVATED LIPASE: ICD-10-CM

## 2025-05-06 DIAGNOSIS — I10 HYPERTENSION, UNSPECIFIED TYPE: Primary | ICD-10-CM

## 2025-05-06 LAB
ALBUMIN SERPL-MCNC: 4.7 G/DL (ref 3.4–5)
ALBUMIN/GLOB SERPL: 2.2 {RATIO} (ref 1.1–2.2)
ALP SERPL-CCNC: 81 U/L (ref 40–129)
ALT SERPL-CCNC: 56 U/L (ref 10–40)
ANION GAP SERPL CALCULATED.3IONS-SCNC: 9 MMOL/L (ref 3–16)
AST SERPL-CCNC: 28 U/L (ref 15–37)
BASOPHILS # BLD: 0 K/UL (ref 0–0.2)
BASOPHILS NFR BLD: 0.7 %
BILIRUB SERPL-MCNC: 0.5 MG/DL (ref 0–1)
BUN SERPL-MCNC: 22 MG/DL (ref 7–20)
CALCIUM SERPL-MCNC: 10.3 MG/DL (ref 8.3–10.6)
CHLORIDE SERPL-SCNC: 105 MMOL/L (ref 99–110)
CO2 SERPL-SCNC: 25 MMOL/L (ref 21–32)
CREAT SERPL-MCNC: 0.9 MG/DL (ref 0.6–1.2)
DEPRECATED RDW RBC AUTO: 13.3 % (ref 12.4–15.4)
EKG ATRIAL RATE: 72 BPM
EKG DIAGNOSIS: NORMAL
EKG P AXIS: 61 DEGREES
EKG P-R INTERVAL: 160 MS
EKG Q-T INTERVAL: 376 MS
EKG QRS DURATION: 76 MS
EKG QTC CALCULATION (BAZETT): 411 MS
EKG R AXIS: 25 DEGREES
EKG T AXIS: 50 DEGREES
EKG VENTRICULAR RATE: 72 BPM
EOSINOPHIL # BLD: 0.1 K/UL (ref 0–0.6)
EOSINOPHIL NFR BLD: 1.7 %
GFR SERPLBLD CREATININE-BSD FMLA CKD-EPI: 68 ML/MIN/{1.73_M2}
GLUCOSE SERPL-MCNC: 126 MG/DL (ref 70–99)
HCT VFR BLD AUTO: 42.7 % (ref 36–48)
HGB BLD-MCNC: 14.5 G/DL (ref 12–16)
INR PPP: 0.98 (ref 0.85–1.15)
LIPASE SERPL-CCNC: 177 U/L (ref 13–60)
LYMPHOCYTES # BLD: 2.2 K/UL (ref 1–5.1)
LYMPHOCYTES NFR BLD: 30.3 %
MCH RBC QN AUTO: 29.1 PG (ref 26–34)
MCHC RBC AUTO-ENTMCNC: 34.1 G/DL (ref 31–36)
MCV RBC AUTO: 85.3 FL (ref 80–100)
MONOCYTES # BLD: 0.5 K/UL (ref 0–1.3)
MONOCYTES NFR BLD: 7.4 %
NEUTROPHILS # BLD: 4.5 K/UL (ref 1.7–7.7)
NEUTROPHILS NFR BLD: 59.9 %
NT-PROBNP SERPL-MCNC: 106 PG/ML (ref 0–124)
PLATELET # BLD AUTO: 215 K/UL (ref 135–450)
PMV BLD AUTO: 7.4 FL (ref 5–10.5)
POTASSIUM SERPL-SCNC: 3.8 MMOL/L (ref 3.5–5.1)
PROT SERPL-MCNC: 6.8 G/DL (ref 6.4–8.2)
PROTHROMBIN TIME: 13.2 SEC (ref 11.9–14.9)
RBC # BLD AUTO: 5 M/UL (ref 4–5.2)
SODIUM SERPL-SCNC: 139 MMOL/L (ref 136–145)
TROPONIN, HIGH SENSITIVITY: 7 NG/L (ref 0–14)
TROPONIN, HIGH SENSITIVITY: 8 NG/L (ref 0–14)
WBC # BLD AUTO: 7.4 K/UL (ref 4–11)

## 2025-05-06 PROCEDURE — 74176 CT ABD & PELVIS W/O CONTRAST: CPT

## 2025-05-06 PROCEDURE — 84484 ASSAY OF TROPONIN QUANT: CPT

## 2025-05-06 PROCEDURE — 99285 EMERGENCY DEPT VISIT HI MDM: CPT

## 2025-05-06 PROCEDURE — 83690 ASSAY OF LIPASE: CPT

## 2025-05-06 PROCEDURE — 71045 X-RAY EXAM CHEST 1 VIEW: CPT

## 2025-05-06 PROCEDURE — 96374 THER/PROPH/DIAG INJ IV PUSH: CPT

## 2025-05-06 PROCEDURE — 85610 PROTHROMBIN TIME: CPT

## 2025-05-06 PROCEDURE — 83880 ASSAY OF NATRIURETIC PEPTIDE: CPT

## 2025-05-06 PROCEDURE — 80053 COMPREHEN METABOLIC PANEL: CPT

## 2025-05-06 PROCEDURE — 85025 COMPLETE CBC W/AUTO DIFF WBC: CPT

## 2025-05-06 PROCEDURE — 70450 CT HEAD/BRAIN W/O DYE: CPT

## 2025-05-06 PROCEDURE — 2580000003 HC RX 258

## 2025-05-06 PROCEDURE — 93005 ELECTROCARDIOGRAM TRACING: CPT

## 2025-05-06 PROCEDURE — 93010 ELECTROCARDIOGRAM REPORT: CPT | Performed by: INTERNAL MEDICINE

## 2025-05-06 PROCEDURE — 6360000002 HC RX W HCPCS

## 2025-05-06 RX ADMIN — FAMOTIDINE 20 MG: 10 INJECTION, SOLUTION INTRAVENOUS at 08:56

## 2025-05-06 ASSESSMENT — PAIN - FUNCTIONAL ASSESSMENT: PAIN_FUNCTIONAL_ASSESSMENT: 0-10

## 2025-05-06 ASSESSMENT — PAIN SCALES - GENERAL: PAINLEVEL_OUTOF10: 2

## 2025-05-06 NOTE — ED PROVIDER NOTES
Seen evaluated by STEPHEN:    EKG: Normal sinus rhythm with a rate of 72.  Normal axis.  Normal intervals and durations.  No significant ST or T wave changes appreciated.  No acute signs of ischemia.         Kwesi Banks II,   05/06/25 0900       Kwesi Banks II,   05/06/25 0905

## 2025-05-06 NOTE — ED PROVIDER NOTES
[facility] EMERGENCY DEPARTMENT  Emergency Department Encounter    Patient Name: Isabella Bassett  MRN: [id number]  YOB: 1955  Date of Evaluation: 5/6/2025  Provider: Aaron Chandler MD  Note Started: 8:18 AM EDT 5/6/25    CHIEF COMPLAINT  Chest Pain (Pt reports woke up today feeling flushed in her face, pt reports went to work, @ 0730 started having some left sided facial numbness, pt reports burning in her esophagus and stomach. Pt reports coworker checked BP and was high at work. )    SHARED SERVICE VISIT  STEPHEN. I have evaluated this patient.      HISTORY OF PRESENT ILLNESS  History From: Patient.    Limitations to history : None    Isabella Bassett is a 70 y.o. female with history of GERD, hyperlipidemia, and kidney donor who presents to the ED for evaluation of feeling flushed, chest pain, and high blood pressure.  Patient states that she has a history of GERD.  Does not typically take any medications to treat this as this has not been causing any significant symptoms recently.  States that last night she went to bed with slight epigastric region abdominal discomfort.  States that when she woke up this morning she noticed that her face, neck, and chest were all erythematous and flushed.  Patient has a history of shellfish allergy however denies any recent new foods, soaps, detergents, etc.  Denies any shortness of breath or throat swelling.  Denies any facial swelling or angioedema.  Patient states that she went to work at around 7 AM and around 7:30 AM she noticed some left-sided facial numbness.  Patient states that while she has been at work she has noticed that her epigastric discomfort has worsened and is now extending into the chest.  Describes this pain as burning.  States that she feels this is similar to her previous history of GERD and gastritis.  Patient states that while she was feeling all the symptoms she had a coworker check her blood pressure and it was elevated at 202/96.

## 2025-06-10 ENCOUNTER — APPOINTMENT (OUTPATIENT)
Dept: CT IMAGING | Age: 70
DRG: 100 | End: 2025-06-10
Attending: EMERGENCY MEDICINE
Payer: MEDICARE

## 2025-06-10 ENCOUNTER — HOSPITAL ENCOUNTER (INPATIENT)
Age: 70
LOS: 1 days | Discharge: HOME | DRG: 100 | End: 2025-06-11
Attending: EMERGENCY MEDICINE | Admitting: INTERNAL MEDICINE
Payer: MEDICARE

## 2025-06-10 DIAGNOSIS — F40.240 CLAUSTROPHOBIA: ICD-10-CM

## 2025-06-10 DIAGNOSIS — R26.2 DIFFICULTY WALKING: ICD-10-CM

## 2025-06-10 DIAGNOSIS — H53.9 VISION CHANGES: ICD-10-CM

## 2025-06-10 DIAGNOSIS — G40.109 PARTIAL SYMPTOMATIC EPILEPSY WITH SIMPLE PARTIAL SEIZURES, NOT INTRACTABLE, WITHOUT STATUS EPILEPTICUS (HCC): ICD-10-CM

## 2025-06-10 DIAGNOSIS — G40.919 BREAKTHROUGH SEIZURE (HCC): ICD-10-CM

## 2025-06-10 DIAGNOSIS — I63.9 CEREBROVASCULAR ACCIDENT (CVA), UNSPECIFIED MECHANISM (HCC): Primary | ICD-10-CM

## 2025-06-10 PROBLEM — R29.90 STROKE-LIKE SYMPTOMS: Status: ACTIVE | Noted: 2025-06-10

## 2025-06-10 LAB
ALBUMIN SERPL-MCNC: 4.2 G/DL (ref 3.4–5)
ALBUMIN SERPL-MCNC: 4.5 G/DL (ref 3.4–5)
ALBUMIN/GLOB SERPL: 1.7 {RATIO} (ref 1.1–2.2)
ALBUMIN/GLOB SERPL: 1.8 {RATIO} (ref 1.1–2.2)
ALP SERPL-CCNC: 80 U/L (ref 40–129)
ALP SERPL-CCNC: 85 U/L (ref 40–129)
ALT SERPL-CCNC: 63 U/L (ref 10–40)
ALT SERPL-CCNC: 76 U/L (ref 10–40)
ANION GAP SERPL CALCULATED.3IONS-SCNC: 13 MMOL/L (ref 3–16)
ANION GAP SERPL CALCULATED.3IONS-SCNC: 15 MMOL/L (ref 3–16)
AST SERPL-CCNC: 31 U/L (ref 15–37)
AST SERPL-CCNC: 38 U/L (ref 15–37)
BASOPHILS # BLD: 0 K/UL (ref 0–0.2)
BASOPHILS NFR BLD: 0.5 %
BILIRUB SERPL-MCNC: 0.5 MG/DL (ref 0–1)
BILIRUB SERPL-MCNC: 0.5 MG/DL (ref 0–1)
BUN SERPL-MCNC: 15 MG/DL (ref 7–20)
BUN SERPL-MCNC: 19 MG/DL (ref 7–20)
CALCIUM SERPL-MCNC: 9.8 MG/DL (ref 8.3–10.6)
CALCIUM SERPL-MCNC: 9.9 MG/DL (ref 8.3–10.6)
CHLORIDE SERPL-SCNC: 105 MMOL/L (ref 99–110)
CHLORIDE SERPL-SCNC: 106 MMOL/L (ref 99–110)
CO2 SERPL-SCNC: 21 MMOL/L (ref 21–32)
CO2 SERPL-SCNC: 24 MMOL/L (ref 21–32)
CREAT SERPL-MCNC: 0.9 MG/DL (ref 0.6–1.2)
CREAT SERPL-MCNC: 0.9 MG/DL (ref 0.6–1.2)
DEPRECATED RDW RBC AUTO: 13.7 % (ref 12.4–15.4)
EOSINOPHIL # BLD: 0.1 K/UL (ref 0–0.6)
EOSINOPHIL NFR BLD: 1.7 %
GFR SERPLBLD CREATININE-BSD FMLA CKD-EPI: 68 ML/MIN/{1.73_M2}
GFR SERPLBLD CREATININE-BSD FMLA CKD-EPI: 68 ML/MIN/{1.73_M2}
GLUCOSE BLD-MCNC: 106 MG/DL (ref 70–99)
GLUCOSE SERPL-MCNC: 114 MG/DL (ref 70–99)
GLUCOSE SERPL-MCNC: 166 MG/DL (ref 70–99)
HCT VFR BLD AUTO: 43.5 % (ref 36–48)
HGB BLD-MCNC: 14.9 G/DL (ref 12–16)
INR PPP: 0.95 (ref 0.86–1.14)
LYMPHOCYTES # BLD: 1.7 K/UL (ref 1–5.1)
LYMPHOCYTES NFR BLD: 22.8 %
MCH RBC QN AUTO: 29.1 PG (ref 26–34)
MCHC RBC AUTO-ENTMCNC: 34.3 G/DL (ref 31–36)
MCV RBC AUTO: 85 FL (ref 80–100)
MONOCYTES # BLD: 0.5 K/UL (ref 0–1.3)
MONOCYTES NFR BLD: 6.3 %
NEUTROPHILS # BLD: 5.1 K/UL (ref 1.7–7.7)
NEUTROPHILS NFR BLD: 68.7 %
PERFORMED ON: ABNORMAL
PLATELET # BLD AUTO: 217 K/UL (ref 135–450)
PMV BLD AUTO: 7.3 FL (ref 5–10.5)
POTASSIUM SERPL-SCNC: 3.8 MMOL/L (ref 3.5–5.1)
POTASSIUM SERPL-SCNC: 4.1 MMOL/L (ref 3.5–5.1)
PROT SERPL-MCNC: 6.7 G/DL (ref 6.4–8.2)
PROT SERPL-MCNC: 7 G/DL (ref 6.4–8.2)
PROTHROMBIN TIME: 12.9 SEC (ref 12.1–14.9)
RBC # BLD AUTO: 5.12 M/UL (ref 4–5.2)
SODIUM SERPL-SCNC: 142 MMOL/L (ref 136–145)
SODIUM SERPL-SCNC: 142 MMOL/L (ref 136–145)
TROPONIN, HIGH SENSITIVITY: 8 NG/L (ref 0–14)
WBC # BLD AUTO: 7.5 K/UL (ref 4–11)

## 2025-06-10 PROCEDURE — 93005 ELECTROCARDIOGRAM TRACING: CPT | Performed by: EMERGENCY MEDICINE

## 2025-06-10 PROCEDURE — 6370000000 HC RX 637 (ALT 250 FOR IP)

## 2025-06-10 PROCEDURE — 85610 PROTHROMBIN TIME: CPT

## 2025-06-10 PROCEDURE — 1200000000 HC SEMI PRIVATE

## 2025-06-10 PROCEDURE — 96375 TX/PRO/DX INJ NEW DRUG ADDON: CPT

## 2025-06-10 PROCEDURE — 6360000002 HC RX W HCPCS

## 2025-06-10 PROCEDURE — 84484 ASSAY OF TROPONIN QUANT: CPT

## 2025-06-10 PROCEDURE — 36415 COLL VENOUS BLD VENIPUNCTURE: CPT

## 2025-06-10 PROCEDURE — 6360000002 HC RX W HCPCS: Performed by: EMERGENCY MEDICINE

## 2025-06-10 PROCEDURE — 80053 COMPREHEN METABOLIC PANEL: CPT

## 2025-06-10 PROCEDURE — 85025 COMPLETE CBC W/AUTO DIFF WBC: CPT

## 2025-06-10 PROCEDURE — 70450 CT HEAD/BRAIN W/O DYE: CPT

## 2025-06-10 PROCEDURE — 70496 CT ANGIOGRAPHY HEAD: CPT

## 2025-06-10 PROCEDURE — 99285 EMERGENCY DEPT VISIT HI MDM: CPT

## 2025-06-10 PROCEDURE — 6360000004 HC RX CONTRAST MEDICATION: Performed by: EMERGENCY MEDICINE

## 2025-06-10 PROCEDURE — 96374 THER/PROPH/DIAG INJ IV PUSH: CPT

## 2025-06-10 RX ORDER — IOPAMIDOL 755 MG/ML
75 INJECTION, SOLUTION INTRAVASCULAR
Status: COMPLETED | OUTPATIENT
Start: 2025-06-10 | End: 2025-06-10

## 2025-06-10 RX ORDER — LORAZEPAM 2 MG/ML
1 INJECTION INTRAMUSCULAR PRN
Status: COMPLETED | OUTPATIENT
Start: 2025-06-10 | End: 2025-06-11

## 2025-06-10 RX ORDER — HYDROCORTISONE SODIUM SUCCINATE 100 MG/2ML
100 INJECTION INTRAMUSCULAR; INTRAVENOUS ONCE
Status: COMPLETED | OUTPATIENT
Start: 2025-06-10 | End: 2025-06-10

## 2025-06-10 RX ORDER — ENOXAPARIN SODIUM 100 MG/ML
40 INJECTION SUBCUTANEOUS DAILY
Status: DISCONTINUED | OUTPATIENT
Start: 2025-06-10 | End: 2025-06-11 | Stop reason: HOSPADM

## 2025-06-10 RX ORDER — LISINOPRIL 10 MG/1
2.5 TABLET ORAL DAILY
COMMUNITY
Start: 2025-05-12

## 2025-06-10 RX ORDER — ATORVASTATIN CALCIUM 80 MG/1
80 TABLET, FILM COATED ORAL NIGHTLY
Status: DISCONTINUED | OUTPATIENT
Start: 2025-06-10 | End: 2025-06-11

## 2025-06-10 RX ORDER — SODIUM CHLORIDE 0.9 % (FLUSH) 0.9 %
5-40 SYRINGE (ML) INJECTION PRN
Status: DISCONTINUED | OUTPATIENT
Start: 2025-06-10 | End: 2025-06-11 | Stop reason: HOSPADM

## 2025-06-10 RX ORDER — LABETALOL HYDROCHLORIDE 5 MG/ML
10 INJECTION, SOLUTION INTRAVENOUS EVERY 6 HOURS PRN
Status: DISCONTINUED | OUTPATIENT
Start: 2025-06-10 | End: 2025-06-11 | Stop reason: HOSPADM

## 2025-06-10 RX ORDER — DIPHENHYDRAMINE HYDROCHLORIDE 50 MG/ML
25 INJECTION, SOLUTION INTRAMUSCULAR; INTRAVENOUS ONCE
Status: COMPLETED | OUTPATIENT
Start: 2025-06-10 | End: 2025-06-10

## 2025-06-10 RX ORDER — SODIUM CHLORIDE 9 MG/ML
INJECTION, SOLUTION INTRAVENOUS PRN
Status: DISCONTINUED | OUTPATIENT
Start: 2025-06-10 | End: 2025-06-11 | Stop reason: HOSPADM

## 2025-06-10 RX ORDER — ONDANSETRON 2 MG/ML
4 INJECTION INTRAMUSCULAR; INTRAVENOUS EVERY 6 HOURS PRN
Status: DISCONTINUED | OUTPATIENT
Start: 2025-06-10 | End: 2025-06-11 | Stop reason: HOSPADM

## 2025-06-10 RX ORDER — ONDANSETRON 4 MG/1
4 TABLET, ORALLY DISINTEGRATING ORAL EVERY 8 HOURS PRN
Status: DISCONTINUED | OUTPATIENT
Start: 2025-06-10 | End: 2025-06-11 | Stop reason: HOSPADM

## 2025-06-10 RX ORDER — POLYETHYLENE GLYCOL 3350 17 G/17G
17 POWDER, FOR SOLUTION ORAL DAILY PRN
Status: DISCONTINUED | OUTPATIENT
Start: 2025-06-10 | End: 2025-06-11 | Stop reason: HOSPADM

## 2025-06-10 RX ORDER — ASPIRIN 81 MG/1
81 TABLET, CHEWABLE ORAL DAILY
Status: DISCONTINUED | OUTPATIENT
Start: 2025-06-10 | End: 2025-06-11 | Stop reason: HOSPADM

## 2025-06-10 RX ORDER — ATORVASTATIN CALCIUM 10 MG/1
10 TABLET, FILM COATED ORAL NIGHTLY
COMMUNITY

## 2025-06-10 RX ORDER — SODIUM CHLORIDE 0.9 % (FLUSH) 0.9 %
5-40 SYRINGE (ML) INJECTION EVERY 12 HOURS SCHEDULED
Status: DISCONTINUED | OUTPATIENT
Start: 2025-06-10 | End: 2025-06-11 | Stop reason: HOSPADM

## 2025-06-10 RX ORDER — CALCIUM CARBONATE 500 MG/1
500 TABLET, CHEWABLE ORAL 3 TIMES DAILY PRN
Status: DISCONTINUED | OUTPATIENT
Start: 2025-06-10 | End: 2025-06-11 | Stop reason: HOSPADM

## 2025-06-10 RX ORDER — CLOPIDOGREL BISULFATE 75 MG/1
75 TABLET ORAL DAILY
Status: DISCONTINUED | OUTPATIENT
Start: 2025-06-10 | End: 2025-06-11 | Stop reason: HOSPADM

## 2025-06-10 RX ORDER — LISINOPRIL 10 MG/1
10 TABLET ORAL DAILY
Status: DISCONTINUED | OUTPATIENT
Start: 2025-06-10 | End: 2025-06-11 | Stop reason: HOSPADM

## 2025-06-10 RX ADMIN — CLOPIDOGREL BISULFATE 75 MG: 75 TABLET, FILM COATED ORAL at 14:19

## 2025-06-10 RX ADMIN — ASPIRIN 81 MG: 81 TABLET, CHEWABLE ORAL at 14:19

## 2025-06-10 RX ADMIN — DIPHENHYDRAMINE HYDROCHLORIDE 25 MG: 50 INJECTION INTRAMUSCULAR; INTRAVENOUS at 09:13

## 2025-06-10 RX ADMIN — ENOXAPARIN SODIUM 40 MG: 100 INJECTION SUBCUTANEOUS at 21:59

## 2025-06-10 RX ADMIN — HYDROCORTISONE SODIUM SUCCINATE 100 MG: 100 INJECTION, POWDER, FOR SOLUTION INTRAMUSCULAR; INTRAVENOUS at 09:11

## 2025-06-10 RX ADMIN — IOPAMIDOL 75 ML: 755 INJECTION, SOLUTION INTRAVENOUS at 09:30

## 2025-06-10 NOTE — ED PROVIDER NOTES
[facility] EMERGENCY DEPARTMENT  EMERGENCY DEPARTMENT ENCOUNTER        Patient Name: Isabella Bassett  MRN: [id number]  Birthdate 1955  Date of evaluation: 6/10/2025  Provider: Josh Niak MD  PCP: Vernon Sam MD  Note Started: 8:53 AM EDT 6/10/25    CHIEF COMPLAINT       Fatigue (Reports this past weekend she was out of town with friends. States she had a sudden onset of feeling \"flushed\" and \"achy\" states she checked her BP and systolic she reports it was 175. Has been dealing with HTN for the past month. Takes 2.5mg of Lisinopril and has been taking it since been seen in the ED a month ago. Says she's felt fatigued since. Reports last night her legs felt tingly. This morning she reports she felt \"off\" so she had her   follow her to the office. ) and Extremity Weakness (She reports initially both her legs felt tingly but around 0700 she felt like her left foot was \"dropping\" and later this morning she reports it feels like her left arm is numb and tingly. )      HISTORY OF PRESENT ILLNESS: 1 or more Elements     History from : Patient    Limitations to history : None    Isabella Bassett is a 70 y.o. female who presents for evaluation of fatigue, extremity weakness.  Patient reports that she woke up around 7 AM this morning.  She had gone to her office.  She reports that when she woke up this morning, she feels that her bilateral lower extremities were weak and muscles felt like \"mush.\"  She states around 730, she felt that her left lower extremity was weak and had paresthesias and numbness in the left arm.  She reports recent blood pressure issues.  She was starting to take lisinopril over the past month.  She currently denies any paresthesias to the left leg    Nursing Notes were all reviewed and agreed with or any disagreements were addressed in the HPI.    REVIEW OF SYSTEMS :      Review of Systems    Positives and Pertinent negatives as per HPI.     SURGICAL HISTORY     Past

## 2025-06-10 NOTE — H&P
[facility] Medicine History & Physical    V 5.1    Date of Admission: 6/10/2025    Date of Service:  Pt seen/examined on 6/10/25     [x]Admitted to Inpatient with expected LOS greater than two midnights due to medical therapy.  []Placed in Observation status.    Chief Admission Complaint:  stroke like symptoms    Presenting Admission History:  70 y.o. female with past medical history significant for hypertension.  Presented to [facility] with headache, blurred vision, \"foggy\" feeling, flushed, L foot \"flopping\" and numbness in her left arm that started around 0830 this morning.  She works at a doctor's office, and was sent here once she informed them of her symptoms.  Pt reports that with exception of arm and leg symptoms, this is her 3rd episode in the past month.  Denies LOC during any episodes.  Denies alcohol use, tobacco use, illicit drugs.  There are no aggravating or alleviating factors.  No radiation of symptoms.      Assessment/Plan:      Stroke-like symptoms.  TIA vs CVA vs atypical migraine.  CT head: no intracranial hemorrhage; probable small meningioma R frontotemporal.  CTA head/neck: no acute vascular abnormality; no LVO.  MRI brain with and w/o contrast ordered 6/10.  Stroke team was contacted by the ED - pt not TNK candidate.  Per stroke team recommendations, pt was initiated on ASA, plavix, and statin.  PT/OT/SLP consulted.  Neuro was consulted, appreciate recs.  Lipid panel, A1c have been ordered.  Stroke order set in place.  Most recent NIH = 0.  DVT prophylaxis: lovenox    Essential hypertension.  Pt reports was prescribed lisinopril 10mg, but was taking 2.5mg.  Holding lisinopril at this time to allow for permissive hypertension.  BP on arrival was 184/85, it dropped to 138/65 without intervention.  Monitor    CXR: I have reviewed the CXR with the following interpretation: N/A  EKG:  I have reviewed the EKG with the following interpretation: NSR      Physical Exam  Surgical History:        Procedure Laterality Date     SECTION      2 surgeries    COLON SURGERY       for mid-transverse volvulus;& then release of small bowel obstruction    COLONOSCOPY  2015    normal    KIDNEY REMOVAL Left        Medications Prior to Admission:   Prior to Admission medications    Medication Sig Start Date End Date Taking? Authorizing Provider   lisinopril (PRINIVIL;ZESTRIL) 10 MG tablet Take 1 tablet by mouth daily Patient reporting she takes 2.5 mg 25  Yes Robby Fraga MD   lidocaine-prilocaine (EMLA) 2.5-2.5 % cream Apply 2.5 g topically 1 hour before procedure. 22   Anam Gonzalez MD   calcium carbonate (TUMS) 500 MG chewable tablet Take 1 tablet by mouth as needed for Heartburn    ProviderRobby MD   Albuterol Sulfate (PROVENTIL HFA IN) Inhale into the lungs as needed    ProviderRobby MD   acetaminophen (TYLENOL) 500 MG tablet Take 500 mg by mouth every 6 hours as needed for Pain    Robby Fraga MD   ibuprofen (ADVIL;MOTRIN) 200 MG CAPS Take 1 capsule by mouth as needed.      ProviderRobby MD   ranitidine (ZANTAC) 150 MG tablet Take 150 mg by mouth as needed.      ProviderRobby MD       Labs: Personally reviewed and interpreted for clinical significance.   Recent Labs     06/10/25  0903   WBC 7.5   HGB 14.9   HCT 43.5        Recent Labs     06/10/25  0903      K 4.1      CO2 24   BUN 19   CREATININE 0.9   CALCIUM 9.9     Recent Labs     06/10/25  0903   TROPHS 8     No results for input(s): \"LABA1C\" in the last 72 hours.  Recent Labs     06/10/25  0903   AST 38*   ALT 76*   BILITOT 0.5   ALKPHOS 85     Recent Labs     06/10/25  0903   INR 0.95        Laura Gonzalez, APRN - CNP

## 2025-06-10 NOTE — ED NOTES
Code Stroke   @0905  [facility]ed [facility]  @0905 Called CT   @0905 Called  Stroke Team  @0906 Called Lab   @0911  Stroke team called back and spoke to

## 2025-06-10 NOTE — ED NOTES
Isabella Bassett is a 70 y.o. female admitted for  Principal Problem:    Stroke-like symptoms  Resolved Problems:    * No resolved hospital problems. *  .   Patient Home via self with   Chief Complaint   Patient presents with    Fatigue     Reports this past weekend she was out of town with friends. States she had a sudden onset of feeling \"flushed\" and \"achy\" states she checked her BP and systolic she reports it was 175. Has been dealing with HTN for the past month. Takes 2.5mg of Lisinopril and has been taking it since been seen in the ED a month ago. Says she's felt fatigued since. Reports last night her legs felt tingly. This morning she reports she felt \"off\" so she had her   follow her to the office.     Extremity Weakness     She reports initially both her legs felt tingly but around 0700 she felt like her left foot was \"dropping\" and later this morning she reports it feels like her left arm is numb and tingly.    .  Patient is alert and Person, Place, Time, and Situation  Patient's baseline mobility: Baseline Mobility: Independent   Code Status: Prior   Cardiac Rhythm:       Is patient on baseline Oxygen: no how many Liters:   Abnormal Assessment Findings: NIH 0    Isolation: None      NIH Score:    C-SSRS: Risk of Suicide: No Risk  Bedside swallow:        Active LDA's:   Peripheral IV 06/10/25 Right Antecubital (Active)   Site Assessment Clean, dry & intact 06/10/25 0902   Line Status Blood return noted 06/10/25 0902   Phlebitis Assessment No symptoms 06/10/25 0902   Infiltration Assessment 0 06/10/25 0902     Patient admitted with a mosley: no If the mosley is chronic was it exchanged:  Reason for mosley:   Patient admitted with Central Line:  NA . PICC line placement confirmed: YES OR NO:[id number]}   Reason for Central line:   Was central line Inserted from an outside facility:        Family/Caregiver Present no Any Concerns: no   Restraints no  Sitter no         Vitals: MEWS Score: 1    Vitals:

## 2025-06-10 NOTE — CONSULTS
Consult Placed     Who: Dr. Hernandez  Date: 6/10/25  Time: 5:05PM     Electronically signed by Norma Gentile RN on 6/10/2025 at 5:04 PM

## 2025-06-10 NOTE — PROGRESS NOTES
4 Eyes Skin Assessment     NAME:  Isabella Bassett  YOB: 1955  MEDICAL RECORD NUMBER:  [id number]    The patient is being assessed for  Admission    I agree that at least one RN has performed a thorough Head to Toe Skin Assessment on the patient. ALL assessment sites listed below have been assessed.      Areas assessed by both nurses:    Head, Face, Ears, Shoulders, Back, Chest, Arms, Elbows, Hands, Sacrum. Buttock, Coccyx, Ischium, Legs. Feet and Heels, and Under Medical Devices         Does the Patient have a Wound? No noted wound(s)       Brent[place] Prevention initiated by RN: No  Wound Care Orders initiated by RN: No    Pressure Injury (Stage 3,4, Unstageable, DTI, NWPT, and Complex wounds) if present, place Wound referral order by RN under : No    New Ostomies, if present place, Ostomy referral order under : No     Nurse 1 eSignature: Electronically signed by Yani Crowell RN on 6/10/25 at 6:10 PM EDT    **SHARE this note so that the co-signing nurse can place an eSignature**    Nurse 2 eSignature: {Esignature:[id number]}

## 2025-06-10 NOTE — PLAN OF CARE
Received Emergency Dept page for admission.  Sent to PAYAL Gonzalez admitting/consulting hospitalist.

## 2025-06-11 ENCOUNTER — APPOINTMENT (OUTPATIENT)
Dept: MRI IMAGING | Age: 70
DRG: 100 | End: 2025-06-11
Payer: MEDICARE

## 2025-06-11 VITALS
WEIGHT: 130.3 LBS | HEIGHT: 58 IN | BODY MASS INDEX: 27.35 KG/M2 | OXYGEN SATURATION: 98 % | DIASTOLIC BLOOD PRESSURE: 69 MMHG | TEMPERATURE: 98.6 F | SYSTOLIC BLOOD PRESSURE: 115 MMHG | HEART RATE: 76 BPM | RESPIRATION RATE: 18 BRPM

## 2025-06-11 LAB
ANION GAP SERPL CALCULATED.3IONS-SCNC: 9 MMOL/L (ref 3–16)
BUN SERPL-MCNC: 16 MG/DL (ref 7–20)
CALCIUM SERPL-MCNC: 9.7 MG/DL (ref 8.3–10.6)
CHLORIDE SERPL-SCNC: 107 MMOL/L (ref 99–110)
CHOLEST SERPL-MCNC: 148 MG/DL (ref 0–199)
CO2 SERPL-SCNC: 26 MMOL/L (ref 21–32)
CREAT SERPL-MCNC: 0.8 MG/DL (ref 0.6–1.2)
DEPRECATED RDW RBC AUTO: 13.5 % (ref 12.4–15.4)
EKG ATRIAL RATE: 67 BPM
EKG DIAGNOSIS: NORMAL
EKG P AXIS: 54 DEGREES
EKG P-R INTERVAL: 168 MS
EKG Q-T INTERVAL: 386 MS
EKG QRS DURATION: 74 MS
EKG QTC CALCULATION (BAZETT): 407 MS
EKG R AXIS: 18 DEGREES
EKG T AXIS: 35 DEGREES
EKG VENTRICULAR RATE: 67 BPM
EST. AVERAGE GLUCOSE BLD GHB EST-MCNC: 119.8 MG/DL
GFR SERPLBLD CREATININE-BSD FMLA CKD-EPI: 78 ML/MIN/{1.73_M2}
GLUCOSE BLD-MCNC: 104 MG/DL (ref 70–99)
GLUCOSE SERPL-MCNC: 104 MG/DL (ref 70–99)
HBA1C MFR BLD: 5.8 %
HCT VFR BLD AUTO: 39.7 % (ref 36–48)
HDLC SERPL-MCNC: 48 MG/DL (ref 40–60)
HGB BLD-MCNC: 13.5 G/DL (ref 12–16)
LDLC SERPL CALC-MCNC: 64 MG/DL
MCH RBC QN AUTO: 28.8 PG (ref 26–34)
MCHC RBC AUTO-ENTMCNC: 34.1 G/DL (ref 31–36)
MCV RBC AUTO: 84.5 FL (ref 80–100)
PERFORMED ON: ABNORMAL
PLATELET # BLD AUTO: 204 K/UL (ref 135–450)
PMV BLD AUTO: 7.3 FL (ref 5–10.5)
POTASSIUM SERPL-SCNC: 3.7 MMOL/L (ref 3.5–5.1)
RBC # BLD AUTO: 4.69 M/UL (ref 4–5.2)
SODIUM SERPL-SCNC: 142 MMOL/L (ref 136–145)
TRIGL SERPL-MCNC: 179 MG/DL (ref 0–150)
VLDLC SERPL CALC-MCNC: 36 MG/DL
WBC # BLD AUTO: 7 K/UL (ref 4–11)

## 2025-06-11 PROCEDURE — 2500000003 HC RX 250 WO HCPCS

## 2025-06-11 PROCEDURE — 6360000002 HC RX W HCPCS

## 2025-06-11 PROCEDURE — 80048 BASIC METABOLIC PNL TOTAL CA: CPT

## 2025-06-11 PROCEDURE — 93010 ELECTROCARDIOGRAM REPORT: CPT | Performed by: INTERNAL MEDICINE

## 2025-06-11 PROCEDURE — 97161 PT EVAL LOW COMPLEX 20 MIN: CPT

## 2025-06-11 PROCEDURE — 6370000000 HC RX 637 (ALT 250 FOR IP)

## 2025-06-11 PROCEDURE — 36415 COLL VENOUS BLD VENIPUNCTURE: CPT

## 2025-06-11 PROCEDURE — 70551 MRI BRAIN STEM W/O DYE: CPT

## 2025-06-11 PROCEDURE — 99222 1ST HOSP IP/OBS MODERATE 55: CPT | Performed by: STUDENT IN AN ORGANIZED HEALTH CARE EDUCATION/TRAINING PROGRAM

## 2025-06-11 PROCEDURE — 97116 GAIT TRAINING THERAPY: CPT

## 2025-06-11 PROCEDURE — 85027 COMPLETE CBC AUTOMATED: CPT

## 2025-06-11 PROCEDURE — 80061 LIPID PANEL: CPT

## 2025-06-11 PROCEDURE — 83036 HEMOGLOBIN GLYCOSYLATED A1C: CPT

## 2025-06-11 RX ORDER — ACETAMINOPHEN 325 MG/1
650 TABLET ORAL EVERY 6 HOURS PRN
Status: DISCONTINUED | OUTPATIENT
Start: 2025-06-11 | End: 2025-06-11 | Stop reason: HOSPADM

## 2025-06-11 RX ORDER — LORAZEPAM 1 MG/1
1 TABLET ORAL
Qty: 1 TABLET | Refills: 0 | Status: SHIPPED | OUTPATIENT
Start: 2025-06-11 | End: 2025-12-11

## 2025-06-11 RX ORDER — OXCARBAZEPINE 300 MG/1
TABLET, FILM COATED ORAL
Qty: 60 TABLET | Refills: 11 | Status: SHIPPED | OUTPATIENT
Start: 2025-06-11 | End: 2026-06-13

## 2025-06-11 RX ORDER — ATORVASTATIN CALCIUM 10 MG/1
10 TABLET, FILM COATED ORAL NIGHTLY
Status: DISCONTINUED | OUTPATIENT
Start: 2025-06-11 | End: 2025-06-11 | Stop reason: HOSPADM

## 2025-06-11 RX ADMIN — CLOPIDOGREL BISULFATE 75 MG: 75 TABLET, FILM COATED ORAL at 10:20

## 2025-06-11 RX ADMIN — LORAZEPAM 1 MG: 2 INJECTION INTRAMUSCULAR; INTRAVENOUS at 06:22

## 2025-06-11 RX ADMIN — ASPIRIN 81 MG: 81 TABLET, CHEWABLE ORAL at 10:20

## 2025-06-11 RX ADMIN — Medication 10 ML: at 10:23

## 2025-06-11 RX ADMIN — ENOXAPARIN SODIUM 40 MG: 100 INJECTION SUBCUTANEOUS at 10:21

## 2025-06-11 NOTE — PROGRESS NOTES
4 Eyes Skin Assessment and Patient belongings     The patient is being assess for  Admission    I agree that 2 Nurses have performed a thorough Head to Toe Skin Assessment on the patient. ALL assessment sites listed below have been assessed.       Areas assessed by both nurses:   [x]   Head, Face, and Ears   [x]   Shoulders, Back, and Chest  [x]   Arms, Elbows, and Hands   [x]   Coccyx, Sacrum, and IschIum  [x]   Legs, Feet, and Heels        Does the Patient have Skin Breakdown?  No         Brenton Prevention initiated:  No   Wound Care Orders initiated:  No      [facility] nurse consulted for Pressure Injury (Stage 3,4, Unstageable, DTI, NWPT, and Complex wounds), New and Established Ostomies:  No      I agree that 2 Nurses have reviewed patient belongings with the patient/family and documented in the flowsheet upon admission or transfer to the unit.     Belongings  Dental Appliances: None  Vision - Corrective Lenses: Eyeglasses  Hearing Aid: None  Clothing: Undergarments, Shirt, Pants, Footwear  Jewelry: Ring, Bracelet, At bedside  Electronic Devices: Cell Phone, At bedside  Weapons (Notify Protective Services/Security): None  Home Medications: None  Valuables Given To: Patient  Provide Name(s) of Who Valuable(s) Were Given To: n/a       Nurse 1 eSignature: Electronically signed by CRISTOPHER BECK RN on 6/11/25 at 10:24 AM EDT    **SHARE this note so that the co-signing nurse is able to place an eSignature**    Nurse 2 eSignature: Electronically signed by Norma Gentile RN on 6/11/25 at 2:53 PM EDT

## 2025-06-11 NOTE — PROGRESS NOTES
IP CONSULT TO STROKE TEAM  IP CONSULT TO NEUROLOGY  IP CONSULT TO CASE MANAGEMENT      The following subspecialty service(s) Neurology was/were consulted and any/all available notes from yesterday and today were reviewed on 6/11/2025, w/ plan for continued inpatient w/up and management as noted above in the assessment and plan     --------------------------------------------------    Medications:        Infusion Medications    sodium chloride       Scheduled Medications    aspirin  81 mg Oral Daily    clopidogrel  75 mg Oral Daily    [Held by provider] lisinopril  10 mg Oral Daily    sodium chloride flush  5-40 mL IntraVENous 2 times per day    enoxaparin  40 mg SubCUTAneous Daily    atorvastatin  80 mg Oral Nightly     PRN Meds: acetaminophen, calcium carbonate, sodium chloride flush, sodium chloride, ondansetron **OR** ondansetron, polyethylene glycol, labetalol      Physical Exam Performed:      General appearance:  No apparent distress.  Sitting EOB.  Visitors bedside  Respiratory:  Normal respiratory effort without tachypnea.  Room air  Cardiovascular:  Regular Rate w/ Regular rhythm.  Abdomen:  Soft, non-tender, non-distended. Bowel sounds normal  Musculoskeletal:  No edema  Neurologic:  Neurovascularly intact without focal sensory/motor deficits.  Psychiatric:  Alert and oriented    /68   Pulse 68   Temp 98.2 °F (36.8 °C) (Oral)   Resp 18   Ht 1.473 m (4' 10\")   Wt 59.1 kg (130 lb 4.8 oz)   SpO2 99%   BMI 27.23 kg/m²     Telemetry:      Personally reviewed and interpreted telemetry (Rhythm Strip) on 6/11/2025.  Patient is currently ON tele demonstrating NSR w/ controlled rate.    Diet: ADULT DIET; Regular    DVT Prophylaxis: PPX dose LMWH    Code status: Full Code    PT/OT Eval Status: Ordered and pending    Multi-Disciplinary Rounds with Case Management completed on 6/11/2025 with the following recs:     Anticipated Discharge Location: Home     Anticipated Discharge Day/Date: later today    HCT 43.5 39.7    204     Recent Labs     06/10/25  0903 06/10/25  1606 06/11/25  0557    142 142   K 4.1 3.8 3.7    106 107   CO2 24 21 26   BUN 19 15 16   CREATININE 0.9 0.9 0.8   CALCIUM 9.9 9.8 9.7     Recent Labs     06/10/25  0903   TROPHS 8     No results for input(s): \"LABA1C\" in the last 72 hours.  Recent Labs     06/10/25  0903 06/10/25  1606   AST 38* 31   ALT 76* 63*   BILITOT 0.5 0.5   ALKPHOS 85 80     Recent Labs     06/10/25  0903   INR 0.95       Urine Cultures: No results found for: \"LABURIN\"  Blood Cultures: No results found for: \"BC\"  No results found for: \"BLOODCULT2\"  Organism: No results found for: \"ORG\"      Laura Gonzalez, APRN - CNP

## 2025-06-11 NOTE — DISCHARGE SUMMARY
Hospital Medicine Discharge Summary    Patient: Iasbella Bassett   : 1955     Hospital:  [facility]  Admit Date: 6/10/2025   Discharge Date: 2025   Disposition:  Home   Code status:  Full  Condition at Discharge: Stable  Primary Care Provider: Vernon Sam MD    Admitting Provider: Louie Bentley MD  Discharge Provider: Laura Gonzalez, APRN - CNP     Discharge Diagnoses:      Active Hospital Problems    Diagnosis     Stroke-like symptoms [R29.90]      Presenting Admission History:    70 y.o. female with past medical history significant for hypertension.  Presented to [facility] with headache, blurred vision, \"foggy\" feeling, flushed, L foot \"flopping\" and numbness in her left arm that started around 0830 this morning.  She works at a doctor's office, and was sent here once she informed them of her symptoms.  Pt reports that with exception of arm and leg symptoms, this is her 3rd episode in the past month.  Denies LOC during any episodes.  Denies alcohol use, tobacco use, illicit drugs.  There are no aggravating or alleviating factors.  No radiation of symptoms      Assessment/Plan:       Stroke-like symptoms.  TIA vs CVA vs atypical migraine.  CT head: no intracranial hemorrhage; probable small meningioma R frontotemporal.  CTA head/neck: no acute vascular abnormality; no LVO.  MRI brain with and w/o contrast ordered 6/10, this order was changed to w/o contrast by MRI with reason pt refused contrast.  MRI brain: no acute stroke or hemorrhage; possible meningioma.   Stroke team was contacted by the ED - pt not TNK candidate.  PT/OT/SLP were consulted.  Neuro was consulted, recommend: outpatient MRI w/ contrast; EEG; follow up in 3 months, appreciate recs.  Per neuro - focal symptomatic epilepsy: trileptal 150mg BID x1 week, then 300mg BID; seizure precautions.  Lipid panel WNL with exception of trig 179.  A1c was 5.8 this admission.  Stroke order set was in

## 2025-06-11 NOTE — CONSULTS
Consult Placed     Who: Kiana Moss,   Date:6/11/2025   Time:2:55 PM      Electronically signed by Aguila Douglass on 6/11/2025 at 2:55 PM

## 2025-06-11 NOTE — PLAN OF CARE
TODAY'S DATE:  6/11/2025      Current NIHSS 0      Discussed personal risk factors for Stroke/TIA with patient/family, and ways to reduce the risk for a recurrent stroke.     Patient's personal risk factors which were identified are:   []   Alcohol Abuse: check with your physician before any alcohol consumption.   []   Atrial fibrillation: may cause blood clots  []   Drug Abuse: Seek help, talk with your doctor  []   Clotting Disorder  []   Diabetes  []   Family history of stroke or heart disease  [x]   High Blood Pressure/Hypertension: work with your physician  [x]   High cholesterol: monitor cholesterol levels with your physician  []   Overweight/Obesity: work with your physician for your ideal body weight  [x]   Physical Inactivity: get regular exercise as directed by your physician  []   Personal history of previous TIA or stroke  []   Poor Diet: decrease salt (sodium) in your diet, follow diet directed by physician  []   Smoking: stop smoking      Reviewed the Following Education with Patient and/or Family:   - Signs and Symptoms of Stroke  - Personal risk factors   - How to activate EMS (911)   - Importance of Follow Up Appointments at Discharge   - Importance of Compliance with Medications Prescribed at Discharge  - Available community resources and stroke advocacy groups if needed    Patient and/or family member: verbalized understanding.     Stroke Education booklet given to patient/family (or verified, if given already), which reviews above information. yes         Electronically signed by CRISTOPHER BECK RN on 6/11/2025 at 2:56 PM

## 2025-06-11 NOTE — PROGRESS NOTES
Occupational Therapy  OT order received. Pt is independent in room. No need for OT eval and tx.  Melissa Caceres OTR/L

## 2025-06-11 NOTE — DISCHARGE INSTRUCTIONS
SEIZURE FIRST AID  Here are some basic instructions for what to do if your loved one has a seizure:    Always stay with the person until the seizure is over.    Pay attention to how long the seizure lasts.    Stay calm. Most seizures only last a few minutes.    Prevent injury. By moving nearby objects out of the way.    Make the person as comfortable as possible.    Keep onlookers away.    Don't hold the person down.    Don't put anything in the person's mouth.    Don't give water, pills, or food by mouth unless the person is fully alert.    Make sure their breathing is okay.    Know when to call for emergency medical help. Call 911 in the following circumstances:    o A seizure lasts 5 minutes or longer   o One seizure happens right after another without the person regaining consciousness (“coming to”) between seizures   o Seizures happen closer together than usual for that person   o The person has trouble breathing   o The person appears to be choking   o The seizure happens in water, like a swimming pool or bathtub   o The person is injured during the seizure   o You believe this is the first seizure the person has had   o The person asks for medical help   Be sensitive and supportive and ask others to do the same    Learn more about seizure first aid at this website: <https://www.epilepsy.com/recognition/seizure-first-aid>.    SEIZURE PRECAUTIONS  You need to avoid or modify certain activities because you have epilepsy. If a seizure were to occur during specific activities listed below, then the consequences could be death or serious injury.     Activity restrictions and modifications:    Driving:   o You have no driving restrictions related to your seizure disorder because your seizures do not cause loss of awareness.      o You can learn more about your state laws regarding driving restrictions and your duty to report your condition to the DMV at this website:  <https://www.epilepsy.com/lifestyle/driving-and-transportation/laws>.   Water Safety: Avoid swimming in a body of water with poor visibility. For example, pools (good visibility) are safer than lakes (poor visibility). If you decide to swim, then wear a life jacket and swim with a suzanne who knows how to swim, has lifesaving skills, and knows how to respond to seizures. Showering is okay. Baths or hot tubs must be supervised.   Fire Safety: Be careful around heat and flames. Sit far back from campfires. Microwaves are safer than electric stoves. Electric stoves are safer than gas stoves. Cook on the back burner of the stove. Set the maximum water temperature to 110 degrees Fahrenheit. Put guards on open fireplaces, wood stoves, and radiators. Don't smoke or use matches when alone.    Climbing Heights: Avoid ladders and unprotected heights.    Equipment and Power Tools: Use safety guards for equipment used for cutting, chopping, and drilling. Make sure all equipment has automatic stop switches.    Other restrictions and/or modifications may be necessary depending on your occupation and hobbies.    Learn more about safety precautions for epilepsy at this website: <https://www.epilepsy.com/preparedness-safety/staying-safe>.    EPILEPSY RESOURCES  You can find many tools, resources, educational material, research updates, and local epilepsy support groups at this website: <https://www.epilepsy.com/>.

## 2025-06-11 NOTE — ACP (ADVANCE CARE PLANNING)
Advance Care Planning   General Advance Care Planning (ACP) Conversation    Date of Conversation: 6/10/2025  Conducted with: Patient with Decision Making Capacity  Other persons present: Spouse Christopher    Healthcare Decision Maker:  No healthcare decision makers have been documented.    Today we documented Decision Maker(s) consistent with Legal Next of Kin hierarchy.  Content/Action Overview:  Has NO ACP documents-Information provided  Reviewed DNR/DNI and patient elects Full Code (Attempt Resuscitation)      Length of Voluntary ACP Conversation in minutes:  <16 minutes (Non-Billable)    Nery Mcgowan RN

## 2025-06-11 NOTE — CONSULTS
Inpatient Neurology Consult  [facility]  Scotty Hernandez MD    Isabella Bassett  1955    Date of Service: 6/11/2025    Referring Physician: Rayo Shea MD    Reason for the consult and CC: acute onset headache, blurred vision, foggy feeling, flushed, left foot flopping, and numbness left arm    HPI:   Isabella Bassett is a 70 y.o. female who  has a past medical history of Asthma, Gastritis, and Reflux. Admitted for acute onset headache, blurred vision, foggy feeling, flushed, left foot flopping, and numbness left arm.     For the past few years she has recurrent more or less stereotyped spells that reliably involve feeling hot, flushed face (observed by ), headache, spike in blood pressure, brain fog, and word finding difficulty. There is variable presence of left face numbness, left leg numbness, left arm numbness, feeling wobbly on feet (?bilateral leg weakness), or subtle left foot drop. The symptoms occur sequentially rather than all at once. She also has long standing history of \"sinus headaches.\" She did have an EEG many years ago for headache but does not recall result. She has experienced numerous spells in total at least 3.     I personally reviewed and updated social history, past medical history, medications, allergy, surgical history, and family history as documented in the patient's electronic health records.     Physical Examination  Mental Status:   Alert   Oriented to time, place, and person   Recent memory normal  Attention normal   Language expression and comprehension normal   Fund of knowledge within range for education     Cranial Nerves:  Visual fields were normal to confrontation   visual acuity was good for face to face and TV distance  PERRL   Ocular motility was full  No nystagmus    Facial sensation was normal  Jaw opening was symmetric   Facial strength symmetrically intact  No dysarthria  Hearing acuity was normal    Shoulder shrug and head turning normal   restrictions due to lack of impaired awareness.     Follow up neurology clinic 3 months.     Neurology will sign off but please contact me if there are additional questions/issues.    Electronically signed by Gary Hernandez MD on 6/11/2025 at 3:35 PM

## 2025-06-11 NOTE — PLAN OF CARE
Problem: Discharge Planning  Goal: Discharge to home or other facility with appropriate resources  Outcome: Completed  Flowsheets (Taken 6/11/2025 1032)  Discharge to home or other facility with appropriate resources: Identify barriers to discharge with patient and caregiver     Problem: Neurosensory - Adult  Goal: Achieves stable or improved neurological status  Outcome: Completed  Goal: Achieves maximal functionality and self care  Outcome: Completed

## 2025-06-11 NOTE — PROGRESS NOTES
Patient discharged to home. IV removed with no complications, telemetry removed CMU notified. Discharge education complete with verbal understanding. All belongings sent home with patient. Patient's family instructed to pickup discharge medication from outpatient pharmacy. Patient ambulated with staff assistance to personal vehicle.

## 2025-06-11 NOTE — CARE COORDINATION
Case Management Assessment  Initial Evaluation    Date/Time of Evaluation: 6/11/2025 10:32 AM  Assessment Completed by: Nery cMgowan RN    If patient is discharged prior to next notation, then this note serves as note for discharge by case management.    Patient Name: Isabella Bassett                   YOB: 1955  Diagnosis: Difficulty walking [R26.2]  Vision changes [H53.9]  Stroke-like symptoms [R29.90]  Cerebrovascular accident (CVA), unspecified mechanism (HCC) [I63.9]                   Date / Time: 6/10/2025  8:50 AM    Patient Admission Status: Inpatient   Readmission Risk (Low < 19, Mod (19-27), High > 27): Readmission Risk Score: 8.3    Current PCP: Vernon Sam MD  PCP verified by CM? Yes    Chart Reviewed: Yes      History Provided by: Patient  Patient Orientation: Alert and Oriented    Patient Cognition: Alert    Hospitalization in the last 30 days (Readmission):  No    If yes, Readmission Assessment in CM Navigator will be completed.    Advance Directives:      Code Status: Full Code   Patient's Primary Decision Maker is: Legal Next of Kin      Discharge Planning:    Patient lives with: Spouse/Significant Other Type of Home: House  Primary Care Giver: Self  Patient Support Systems include: Spouse/Significant Other   Current Financial resources: Medicare  Current community resources: None  Current services prior to admission: None            Current DME:              Type of Home Care services:  None    ADLS  Prior functional level: Independent in ADLs/IADLs  Current functional level: Independent in ADLs/IADLs    PT AM-PAC: 22 /24  OT AM-PAC:   /24    Family can provide assistance at DC: Yes  Would you like Case Management to discuss the discharge plan with any other family members/significant others, and if so, who? Yes ()  Plans to Return to Present Housing: Yes  Other Identified Issues/Barriers to RETURNING to current housing:   Potential Assistance needed at discharge:  N/A            Potential DME:    Patient expects to discharge to: House  Plan for transportation at discharge:      Financial    Payor: MEDICARE / Plan: MEDICARE PART A AND B / Product Type: *No Product type* /     Does insurance require precert for SNF: No    Potential assistance Purchasing Medications: No  Meds-to-Beds request:        [facility] [id number] - [place] - [address] - P [phone] - F [phone]  [address]  [place] [address]  Phone: [phone] Fax: [phone]      Notes:    Factors facilitating achievement of predicted outcomes: Family support, Cooperative, Pleasant, and Good insight into deficits    Barriers to discharge: none    Additional Case Management Notes: Pt is from home with her . SHe reports being IPTA and denies needs. She is an active , has a PCP and insurance. Will continue to follow course for needs. Nery Mcgowan RN     The Plan for Transition of Care is related to the following treatment goals of Difficulty walking [R26.2]  Vision changes [H53.9]  Stroke-like symptoms [R29.90]  Cerebrovascular accident (CVA), unspecified mechanism (HCC) [I63.9]    IF APPLICABLE: The Patient and/or patient representative Isabella and her family were provided with a choice of provider and agrees with the discharge plan. Freedom of choice list with basic dialogue that supports the patient's individualized plan of care/goals and shares the quality data associated with the providers was provided to: Patient   Patient Representative Name:       The Patient and/or Patient Representative Agree with the Discharge Plan? Yes    Nery Mcgowan RN  Case Management Department

## 2025-06-11 NOTE — PLAN OF CARE
Problem: Neurosensory - Adult  Goal: Achieves stable or improved neurological status  Outcome: Progressing   TODAY'S DATE:  6/10/2025      Current NIHSS 0      Discussed personal risk factors for Stroke/TIA with patient/family, and ways to reduce the risk for a recurrent stroke.     Patient's personal risk factors which were identified are:   []   Alcohol Abuse: check with your physician before any alcohol consumption.   []   Atrial fibrillation: may cause blood clots  []   Drug Abuse: Seek help, talk with your doctor  []   Clotting Disorder  []   Diabetes  []   Family history of stroke or heart disease  [x]   High Blood Pressure/Hypertension: work with your physician  []   High cholesterol: monitor cholesterol levels with your physician  []   Overweight/Obesity: work with your physician for your ideal body weight  [x]   Physical Inactivity: get regular exercise as directed by your physician  []   Personal history of previous TIA or stroke  []   Poor Diet: decrease salt (sodium) in your diet, follow diet directed by physician  []   Smoking: stop smoking      Reviewed the Following Education with Patient and/or Family:   - Signs and Symptoms of Stroke  - Personal risk factors   - How to activate EMS (911)   - Importance of Follow Up Appointments at Discharge   - Importance of Compliance with Medications Prescribed at Discharge  - Available community resources and stroke advocacy groups if needed    Patient and/or family member: verbalized understanding.     Stroke Education booklet given to patient/family (or verified, if given already), which reviews above information. yes         Electronically signed by Julia Nicholas RN on 6/10/2025 at 11:31 PM

## 2025-06-11 NOTE — PROGRESS NOTES
Physical Therapy  Facility/Department: [place] [id number] - MED SURG  Physical Therapy Initial Assessment/Discharge Summary    Name: Isabella Bassett  : 1955  MRN: [id number]  Date of Service: 2025    Discharge Recommendations:  Home with assist PRN, OPPT if deficits persist.  PT Equipment Recommendations  Equipment Needed: No      Patient Diagnosis(es): The primary encounter diagnosis was Cerebrovascular accident (CVA), unspecified mechanism (HCC). Diagnoses of Vision changes and Difficulty walking were also pertinent to this visit.  Past Medical History:  has a past medical history of Asthma, Gastritis, and Reflux.  Past Surgical History:  has a past surgical history that includes  section; Colon surgery (); Colonoscopy (2015); and Kidney removal (Left).    Assessment  Body Structures, Functions, Activity Limitations Requiring Skilled Therapeutic Intervention: Decreased functional mobility ;Decreased body mechanics;Decreased safe awareness;Decreased sensation;Decreased balance  Assessment: Pt seen by PT in the acute setting s/p stroke-like symptoms. Pt agreeable to therapy, seated at EOB at beginning and end of session. Pt able to perform sit<>stand transfer with supervision, ambulated 200ft with no AD and SBA, and ascended/descended 8 steps with B handrail use. Pt reported foot drop on L foot that worsens during episodes of numbness and tingling in the LE and is more noticable when wearing shoes, pt stated currently it doesn't feel as drastic and demonstrated decreased foot clearance on LLE. After ambulation pt performed BLE exercises x10. Pt demonstrated adequate functional mobility, body mechanics, balance, and safety awareness. Pt likely with no need for skilled PT in this setting and recommend home with assist PRN at discharge and may benefit from OPPT if deficits persist.  Therapy Prognosis: Good  Decision Making: Low Complexity  No Skilled PT: At baseline function  Requires PT Follow-Up:

## 2025-06-12 ENCOUNTER — HOSPITAL ENCOUNTER (EMERGENCY)
Age: 70
Discharge: HOME | End: 2025-06-12
Attending: EMERGENCY MEDICINE
Payer: MEDICARE

## 2025-06-12 VITALS
TEMPERATURE: 98.3 F | WEIGHT: 129 LBS | SYSTOLIC BLOOD PRESSURE: 181 MMHG | RESPIRATION RATE: 18 BRPM | BODY MASS INDEX: 26.96 KG/M2 | DIASTOLIC BLOOD PRESSURE: 79 MMHG | HEART RATE: 82 BPM | OXYGEN SATURATION: 98 %

## 2025-06-12 VITALS
HEART RATE: 78 BPM | SYSTOLIC BLOOD PRESSURE: 163 MMHG | TEMPERATURE: 98.9 F | BODY MASS INDEX: 27.13 KG/M2 | WEIGHT: 129.8 LBS | DIASTOLIC BLOOD PRESSURE: 81 MMHG | OXYGEN SATURATION: 97 % | RESPIRATION RATE: 16 BRPM

## 2025-06-12 DIAGNOSIS — R58: Primary | ICD-10-CM

## 2025-06-12 DIAGNOSIS — Z71.1 FEARED CONDITION NOT DEMONSTRATED: Primary | ICD-10-CM

## 2025-06-12 DIAGNOSIS — T80.89XA: Primary | ICD-10-CM

## 2025-06-12 PROCEDURE — 99282 EMERGENCY DEPT VISIT SF MDM: CPT

## 2025-06-12 ASSESSMENT — PAIN SCALES - GENERAL: PAINLEVEL_OUTOF10: 4

## 2025-06-12 ASSESSMENT — PAIN DESCRIPTION - LOCATION: LOCATION: ABDOMEN

## 2025-06-12 ASSESSMENT — PAIN - FUNCTIONAL ASSESSMENT: PAIN_FUNCTIONAL_ASSESSMENT: 0-10

## 2025-06-12 NOTE — ED PROVIDER NOTES
Emergency Department Provider Note  Location: [facility] EMERGENCY DEPARTMENT  2025     Patient Identification  Isabella Bassett is a 70 y.o. female    Chief Complaint  injection site bleeding (Upon discharge from hospital yesterday was administered a Lovenox shot. Pt states \" it was a rough shot\", the injection site has continued to bleed since discharge )          HPI  (History provided by patient)  Patient is a 70-year-old female who presents for persistent oozing bleed from Lovenox injection site on the right lower abdomen.  Patient was recently admitted and discharged yesterday for TIA/CVA workup.  She is on dual antiplatelet therapy and was given a Lovenox injection yesterday afternoon just prior to discharge.  Since then she has had oozing bleed from the injection site.  She has tried putting a Band-Aid over it, applying pressure and an ice pack last night and change the dressing at midnight and still has soaked through gauze and her close yesterday.  It is slowed up today.  She denies any abdominal pains any lightheadedness.      Nursing Notes were all reviewed and agreed with, or any disagreements were addressed in the HPI:  Allergies:   Allergies   Allergen Reactions    Shellfish-Derived Products      Throat swells    Definity [Perflutren Lipid Microsphere]     Iv Dye [Iodides]        Past medical history:  has a past medical history of Asthma, Gastritis, and Reflux.    Past surgical history:  has a past surgical history that includes  section; Colon surgery (); Colonoscopy (2015); and Kidney removal (Left).    Home medications:   Prior to Admission medications    Medication Sig Start Date End Date Taking? Authorizing Provider   OXcarbazepine (TRILEPTAL) 300 MG tablet Take 0.5 tablets by mouth 2 times daily for 7 days, THEN 1 tablet 2 times daily. 25  Gary Hernandez MD   LORazepam (ATIVAN) 1 MG tablet Take 1 tablet by mouth ONCE PRN (take 30 minutes prior to

## 2025-06-12 NOTE — ED PROVIDER NOTES
EMERGENCY DEPARTMENT ENCOUNTER     [facility] EMERGENCY DEPARTMENT     Pt Name: Isabella Bassett   MRN: [id number]   Birthdate 1955   Date of evaluation: 2025   Provider: Indiana Naik MD   PCP: Vernon Sam MD   Note Started: 3:15 PM EDT 25     CHIEF COMPLAINT     Chief Complaint   Patient presents with    Coagulation Disorder     Patient recently seen in the ED this morning due to bleeding at the Lovenox injection site.  A pressure dressing was placed and the bleeding stopped however now patient is noticing bruising in the groin area. Called the ED and was told to come back to the ED for evaluation.         HISTORY OF PRESENT ILLNESS:  History from : Patient   Limitations to history : None     Isabella Bassett is a 70 y.o. female who presents for evaluation of bruising pleat.  Patient was seen in the ED this morning for bruising and bleeding at sites of Lovenox injection.  This was treated with a pressure dressing and she states that this had stopped bleeding shortly after ED visit.  She is back because she is concerned about bruising that she had seen in the groin area.    Nursing Notes were all reviewed and agreed with or any disagreements were addressed in the HPI.     ROS: Positives and Pertinent negatives as per HPI.    PAST MEDICAL HISTORY     Past medical history:  has a past medical history of Asthma, Gastritis, and Reflux.    Past surgical history:  has a past surgical history that includes  section; Colon surgery (); Colonoscopy (2015); and Kidney removal (Left).      PHYSICAL EXAM:  ED Triage Vitals [25 1442]   BP Systolic BP Percentile Diastolic BP Percentile Temp Temp Source Pulse Respirations SpO2   (!) 181/79 -- -- 98.3 °F (36.8 °C) Oral 82 18 98 %      Height Weight - Scale         -- 58.5 kg (129 lb)              Physical Exam   No acute distress, there is some bruising at the side of the abdomen of recent Lovenox injection.  With nurse  total shared critical care time excluding separately billable procedures.  I am the primary physician of Record.     FINAL IMPRESSION    1. Feared condition not demonstrated         DISPOSITION/PLAN   DISPOSITION Decision To Discharge 06/12/2025 02:47:25 PM   DISPOSITION CONDITION Stable            PATIENT REFERRED TO:   Vernon Sam MD  [address] [facility] [id number]  [phone]           DISCHARGE MEDICATIONS:   Discharge Medication List as of 6/12/2025  2:50 PM         DISCONTINUED MEDICATIONS:   Discharge Medication List as of 6/12/2025  2:50 PM               (Please note that portions of this note were completed with a voice recognition program.  Efforts were made to edit the dictations but occasionally words are mis-transcribed.)     Indiana Naik MD (electronically signed)      Josh Naik MD  06/12/25 [id number]

## 2025-06-19 ENCOUNTER — HOSPITAL ENCOUNTER (OUTPATIENT)
Dept: MRI IMAGING | Age: 70
Discharge: HOME | End: 2025-06-19
Attending: STUDENT IN AN ORGANIZED HEALTH CARE EDUCATION/TRAINING PROGRAM
Payer: MEDICARE

## 2025-06-19 ENCOUNTER — APPOINTMENT (OUTPATIENT)
Dept: NEUROLOGY | Age: 70
End: 2025-06-19
Attending: STUDENT IN AN ORGANIZED HEALTH CARE EDUCATION/TRAINING PROGRAM
Payer: MEDICARE

## 2025-06-19 DIAGNOSIS — G40.919 BREAKTHROUGH SEIZURE (HCC): ICD-10-CM

## 2025-06-19 DIAGNOSIS — G40.109 PARTIAL SYMPTOMATIC EPILEPSY WITH SIMPLE PARTIAL SEIZURES, NOT INTRACTABLE, WITHOUT STATUS EPILEPTICUS (HCC): ICD-10-CM

## 2025-06-19 PROCEDURE — A9579 GAD-BASE MR CONTRAST NOS,1ML: HCPCS | Performed by: STUDENT IN AN ORGANIZED HEALTH CARE EDUCATION/TRAINING PROGRAM

## 2025-06-19 PROCEDURE — 70552 MRI BRAIN STEM W/DYE: CPT

## 2025-06-19 PROCEDURE — 6360000004 HC RX CONTRAST MEDICATION: Performed by: STUDENT IN AN ORGANIZED HEALTH CARE EDUCATION/TRAINING PROGRAM

## 2025-06-19 RX ORDER — GADOTERIDOL 279.3 MG/ML
13 INJECTION INTRAVENOUS
Status: COMPLETED | OUTPATIENT
Start: 2025-06-19 | End: 2025-06-19

## 2025-06-19 RX ADMIN — GADOTERIDOL 13 ML: 279.3 INJECTION, SOLUTION INTRAVENOUS at 08:46

## 2025-06-30 ENCOUNTER — HOSPITAL ENCOUNTER (OUTPATIENT)
Dept: NEUROLOGY | Age: 70
Discharge: HOME | End: 2025-06-30
Attending: STUDENT IN AN ORGANIZED HEALTH CARE EDUCATION/TRAINING PROGRAM
Payer: COMMERCIAL

## 2025-06-30 DIAGNOSIS — G40.109 PARTIAL SYMPTOMATIC EPILEPSY WITH SIMPLE PARTIAL SEIZURES, NOT INTRACTABLE, WITHOUT STATUS EPILEPTICUS (HCC): ICD-10-CM

## 2025-06-30 DIAGNOSIS — G40.919 BREAKTHROUGH SEIZURE (HCC): ICD-10-CM

## 2025-06-30 PROCEDURE — 95812 EEG 41-60 MINUTES: CPT | Performed by: PSYCHIATRY & NEUROLOGY

## 2025-06-30 PROCEDURE — 95813 EEG EXTND MNTR 61-119 MIN: CPT

## 2025-06-30 NOTE — PROCEDURES
Electroencephalogram report          Patient: Isabella Bassett    MR Number: [id number]  YOB: 1955  Date of Visit: 6/30/2025    Clinical History:  The patient is a 70 y.o. years old female with recurrent spells and possible seizure.      Method:  This is one hour digitalized EEG recording. The EEG was performed using the international 10/20 of electrode placements using both referential and bipolar montages. The patient was awake, and drowsy during recording.  Photic stimulation and hyperventilation were performed. Medications reviewed.     Findings:  The background of the EEG showed normal alpha posterior background of -9-10 HZ and amplitude of 20-40 UV. This background was symmetric, waxing and waning, and reactive with eye opening and closure. As the patient became drowsy generalized diffuse slowing was seen through recording at 6-7 HZ.  This generalized slowing was symmetric, non rhythmical, and continuous. No spike or sharp waves were seen. Photic stimulation produced normal posterior driving at higher frequency and hyperventilation did not activate the EEG.    Impression:  This one hour  EEG is normal. No epileptiform discharges, focal and lateralized abnormalities were noted.     Kalia Romero MD      Board certified in clinical neurophysiology  [facility]  [address] [facility] [address]  Phone: [phone]

## 2025-07-01 ENCOUNTER — TELEPHONE (OUTPATIENT)
Age: 70
End: 2025-07-01

## 2025-07-01 DIAGNOSIS — D32.9 MENINGIOMA (HCC): Primary | ICD-10-CM

## 2025-07-01 NOTE — TELEPHONE ENCOUNTER
Patient called wanting MRI and EEG results    MRI completed on 6/19/25  EEG completed on 6/30/25    Please review and advise

## 2025-07-02 NOTE — TELEPHONE ENCOUNTER
Patient aware of results. She has to 2 questions.    1- Was EEG normal because of the medication or does the medication have no bearing on the results.    2 - She going on vacation in Aug and will be flying. Wants to know if it is safe for her to fly.    Please advise

## 2025-07-02 NOTE — TELEPHONE ENCOUNTER
EEG is normal.   Brain MRI is consistent with a benign tumor of the lining of the brain called meningioma but there are some atypical features.     Recommendations:  Repeat brain MRI w/wo contrast in ~6 months. Will consider referral to neurosurgery at that time depending on result.   No change to treatment plan otherwise.

## 2025-07-03 ENCOUNTER — TELEPHONE (OUTPATIENT)
Dept: NEUROLOGY | Age: 70
End: 2025-07-03

## 2025-08-20 ENCOUNTER — TRANSCRIBE ORDERS (OUTPATIENT)
Dept: ADMINISTRATIVE | Age: 70
End: 2025-08-20

## 2025-08-20 DIAGNOSIS — R56.9 SEIZURES (HCC): Primary | ICD-10-CM

## 2025-08-20 DIAGNOSIS — D32.9 MENINGIOMA (HCC): ICD-10-CM

## 2025-08-25 ENCOUNTER — HOSPITAL ENCOUNTER (OUTPATIENT)
Dept: CT IMAGING | Age: 70
Discharge: HOME | End: 2025-08-25
Payer: MEDICARE

## 2025-08-25 DIAGNOSIS — R56.9 SEIZURES (HCC): ICD-10-CM

## 2025-08-25 DIAGNOSIS — D32.9 MENINGIOMA (HCC): ICD-10-CM

## 2025-08-25 PROCEDURE — 74176 CT ABD & PELVIS W/O CONTRAST: CPT
